# Patient Record
Sex: MALE | Race: WHITE | NOT HISPANIC OR LATINO | Employment: FULL TIME | URBAN - METROPOLITAN AREA
[De-identification: names, ages, dates, MRNs, and addresses within clinical notes are randomized per-mention and may not be internally consistent; named-entity substitution may affect disease eponyms.]

---

## 2017-11-03 ENCOUNTER — TREATMENT (OUTPATIENT)
Dept: PHYSICAL THERAPY | Facility: CLINIC | Age: 47
End: 2017-11-03

## 2017-11-03 DIAGNOSIS — Z02.1 PRE-EMPLOYMENT HEALTH SCREENING EXAMINATION: Primary | ICD-10-CM

## 2017-11-03 PROCEDURE — PDS: Performed by: PHYSICAL THERAPIST

## 2017-11-03 NOTE — PROGRESS NOTES
See full documentation of patient's full treatment in paper chart dated 11/3/2017.  Chart will be in [facility] Med dept.  For [facility].

## 2018-04-24 ENCOUNTER — INPATIENT HOSPITAL (AMBULATORY)
Dept: URBAN - METROPOLITAN AREA HOSPITAL 84 | Facility: HOSPITAL | Age: 48
End: 2018-04-24

## 2018-04-24 DIAGNOSIS — K52.9 NONINFECTIVE GASTROENTERITIS AND COLITIS, UNSPECIFIED: ICD-10-CM

## 2018-04-24 DIAGNOSIS — K20.0 EOSINOPHILIC ESOPHAGITIS: ICD-10-CM

## 2018-04-24 DIAGNOSIS — K20.9 ESOPHAGITIS, UNSPECIFIED: ICD-10-CM

## 2018-04-24 DIAGNOSIS — K56.699 OTHER INTESTINAL OBSTRUCTION UNSPECIFIED AS TO PARTIAL VERSU: ICD-10-CM

## 2018-04-24 DIAGNOSIS — R10.13 EPIGASTRIC PAIN: ICD-10-CM

## 2018-04-24 DIAGNOSIS — K63.3 ULCER OF INTESTINE: ICD-10-CM

## 2018-04-24 DIAGNOSIS — R93.3 ABNORMAL FINDINGS ON DIAGNOSTIC IMAGING OF OTHER PARTS OF DI: ICD-10-CM

## 2018-04-24 DIAGNOSIS — Z90.49 ACQUIRED ABSENCE OF OTHER SPECIFIED PARTS OF DIGESTIVE TRACT: ICD-10-CM

## 2018-04-24 PROCEDURE — 44382 SMALL BOWEL ENDOSCOPY: CPT | Performed by: INTERNAL MEDICINE

## 2018-04-24 PROCEDURE — 43235 EGD DIAGNOSTIC BRUSH WASH: CPT | Performed by: INTERNAL MEDICINE

## 2018-04-26 ENCOUNTER — INPATIENT HOSPITAL (AMBULATORY)
Dept: URBAN - METROPOLITAN AREA HOSPITAL 84 | Facility: HOSPITAL | Age: 48
End: 2018-04-26
Payer: COMMERCIAL

## 2018-04-26 DIAGNOSIS — K63.3 ULCER OF INTESTINE: ICD-10-CM

## 2018-04-26 DIAGNOSIS — K56.699 OTHER INTESTINAL OBSTRUCTION UNSPECIFIED AS TO PARTIAL VERSU: ICD-10-CM

## 2018-04-26 DIAGNOSIS — R93.3 ABNORMAL FINDINGS ON DIAGNOSTIC IMAGING OF OTHER PARTS OF DI: ICD-10-CM

## 2018-04-26 PROCEDURE — 99231 SBSQ HOSP IP/OBS SF/LOW 25: CPT | Performed by: NURSE PRACTITIONER

## 2018-05-03 ENCOUNTER — OFFICE (AMBULATORY)
Dept: URBAN - METROPOLITAN AREA CLINIC 64 | Facility: CLINIC | Age: 48
End: 2018-05-03

## 2018-05-03 VITALS
HEART RATE: 77 BPM | HEIGHT: 72 IN | DIASTOLIC BLOOD PRESSURE: 88 MMHG | WEIGHT: 220 LBS | SYSTOLIC BLOOD PRESSURE: 130 MMHG

## 2018-05-03 DIAGNOSIS — K22.10 ULCER OF ESOPHAGUS WITHOUT BLEEDING: ICD-10-CM

## 2018-05-03 DIAGNOSIS — K51.90 ULCERATIVE COLITIS, UNSPECIFIED, WITHOUT COMPLICATIONS: ICD-10-CM

## 2018-05-03 DIAGNOSIS — I80.9 PHLEBITIS AND THROMBOPHLEBITIS OF UNSPECIFIED SITE: ICD-10-CM

## 2018-05-03 DIAGNOSIS — K44.9 DIAPHRAGMATIC HERNIA WITHOUT OBSTRUCTION OR GANGRENE: ICD-10-CM

## 2018-05-03 DIAGNOSIS — Z90.49 ACQUIRED ABSENCE OF OTHER SPECIFIED PARTS OF DIGESTIVE TRACT: ICD-10-CM

## 2018-05-03 DIAGNOSIS — K21.9 GASTRO-ESOPHAGEAL REFLUX DISEASE WITHOUT ESOPHAGITIS: ICD-10-CM

## 2018-05-03 PROCEDURE — 99213 OFFICE O/P EST LOW 20 MIN: CPT | Performed by: NURSE PRACTITIONER

## 2018-05-03 RX ORDER — OMEPRAZOLE 40 MG/1
CAPSULE, DELAYED RELEASE ORAL
Qty: 90 | Refills: 0 | Status: COMPLETED
End: 2020-01-27

## 2018-05-07 ENCOUNTER — HOSPITAL ENCOUNTER (OUTPATIENT)
Dept: ONCOLOGY | Facility: CLINIC | Age: 48
Discharge: HOME | End: 2018-05-07
Attending: INTERNAL MEDICINE | Admitting: INTERNAL MEDICINE

## 2018-05-07 ENCOUNTER — HOSPITAL ENCOUNTER (OUTPATIENT)
Dept: ONCOLOGY | Facility: CLINIC | Age: 48
Setting detail: INFUSION SERIES
Discharge: HOME | End: 2018-05-07
Attending: INTERNAL MEDICINE | Admitting: INTERNAL MEDICINE

## 2018-05-07 ENCOUNTER — CLINICAL SUPPORT (OUTPATIENT)
Dept: ONCOLOGY | Facility: HOSPITAL | Age: 48
End: 2018-05-07

## 2018-05-07 NOTE — PROGRESS NOTES
PATIENTS ONCOLOGY RECORD LOCATED IN [facility]      Subjective     Name:  JONO GEORGE     Date:  2018  Address:  [address] [place] [address]  Home: [phone]  :  1970 AGE:  47 y.o.        RECORDS OBTAINED:  Patients Oncology Record is located in [facility]

## 2018-05-08 ENCOUNTER — CLINICAL SUPPORT (OUTPATIENT)
Dept: ONCOLOGY | Facility: HOSPITAL | Age: 48
End: 2018-05-08

## 2018-05-08 ENCOUNTER — HOSPITAL ENCOUNTER (OUTPATIENT)
Dept: ONCOLOGY | Facility: CLINIC | Age: 48
Setting detail: INFUSION SERIES
Discharge: HOME | End: 2018-05-08
Attending: INTERNAL MEDICINE | Admitting: INTERNAL MEDICINE

## 2018-05-08 NOTE — PROGRESS NOTES
PATIENTS ONCOLOGY RECORD LOCATED IN [facility]      Subjective     Name:  JONO GEORGE     Date:  2018  Address:  [address] [place] [address]  Home: [phone]  :  1970 AGE:  47 y.o.        RECORDS OBTAINED:  Patients Oncology Record is located in [facility]

## 2018-05-11 ENCOUNTER — HOSPITAL ENCOUNTER (OUTPATIENT)
Dept: ONCOLOGY | Facility: CLINIC | Age: 48
Setting detail: INFUSION SERIES
Discharge: HOME | End: 2018-05-11
Attending: INTERNAL MEDICINE | Admitting: INTERNAL MEDICINE

## 2018-05-11 ENCOUNTER — CLINICAL SUPPORT (OUTPATIENT)
Dept: ONCOLOGY | Facility: HOSPITAL | Age: 48
End: 2018-05-11

## 2018-05-11 NOTE — PROGRESS NOTES
PATIENTS ONCOLOGY RECORD LOCATED IN [facility]      Subjective     Name:  JONO GEORGE     Date:  2018  Address:  [address] [place] [address]  Home: [phone]  :  1970 AGE:  47 y.o.        RECORDS OBTAINED:  Patients Oncology Record is located in [facility]

## 2018-05-17 ENCOUNTER — HOSPITAL ENCOUNTER (OUTPATIENT)
Dept: ONCOLOGY | Facility: CLINIC | Age: 48
Setting detail: INFUSION SERIES
Discharge: HOME | End: 2018-05-17
Attending: INTERNAL MEDICINE | Admitting: INTERNAL MEDICINE

## 2018-05-17 ENCOUNTER — CLINICAL SUPPORT (OUTPATIENT)
Dept: ONCOLOGY | Facility: HOSPITAL | Age: 48
End: 2018-05-17

## 2018-05-17 NOTE — PROGRESS NOTES
PATIENTS ONCOLOGY RECORD LOCATED IN [facility]      Subjective     Name:  JONO GEORGE     Date:  2018  Address:  [address] [place] [address]  Home: [phone]  :  1970 AGE:  47 y.o.        RECORDS OBTAINED:  Patients Oncology Record is located in [facility]

## 2018-05-24 ENCOUNTER — HOSPITAL ENCOUNTER (OUTPATIENT)
Dept: ONCOLOGY | Facility: CLINIC | Age: 48
Setting detail: INFUSION SERIES
Discharge: HOME | End: 2018-05-24
Attending: INTERNAL MEDICINE | Admitting: INTERNAL MEDICINE

## 2018-05-24 ENCOUNTER — HOSPITAL ENCOUNTER (OUTPATIENT)
Dept: ONCOLOGY | Facility: HOSPITAL | Age: 48
Discharge: HOME | End: 2018-05-24
Attending: INTERNAL MEDICINE | Admitting: INTERNAL MEDICINE

## 2018-05-24 ENCOUNTER — CLINICAL SUPPORT (OUTPATIENT)
Dept: ONCOLOGY | Facility: HOSPITAL | Age: 48
End: 2018-05-24

## 2018-05-24 NOTE — PROGRESS NOTES
PATIENTS ONCOLOGY RECORD LOCATED IN [facility] [place]      Subjective     Name:  JONO GEORGE     Date:  2018  Address:  [address] [place] [address]  Home: [phone]  :  1970 AGE:  47 y.o.        RECORDS OBTAINED:  Patients Oncology Record is located in [facility]

## 2018-08-30 ENCOUNTER — CLINICAL SUPPORT (OUTPATIENT)
Dept: ONCOLOGY | Facility: HOSPITAL | Age: 48
End: 2018-08-30

## 2018-08-30 ENCOUNTER — HOSPITAL ENCOUNTER (OUTPATIENT)
Dept: ONCOLOGY | Facility: CLINIC | Age: 48
Setting detail: INFUSION SERIES
Discharge: HOME | End: 2018-08-30
Attending: INTERNAL MEDICINE | Admitting: INTERNAL MEDICINE

## 2018-08-30 ENCOUNTER — HOSPITAL ENCOUNTER (OUTPATIENT)
Dept: ONCOLOGY | Facility: HOSPITAL | Age: 48
Discharge: HOME | End: 2018-08-30
Attending: INTERNAL MEDICINE | Admitting: INTERNAL MEDICINE

## 2018-08-30 LAB
ALBUMIN SERPL-MCNC: 3.4 G/DL (ref 3.5–4.8)
ALBUMIN/GLOB SERPL: 1.1 {RATIO} (ref 1–1.7)
ALP SERPL-CCNC: 55 IU/L (ref 32–91)
ALT SERPL-CCNC: 35 IU/L (ref 17–63)
ANION GAP SERPL CALC-SCNC: 11 MMOL/L (ref 10–20)
AST SERPL-CCNC: 32 IU/L (ref 15–41)
BILIRUB SERPL-MCNC: 0.9 MG/DL (ref 0.3–1.2)
BUN SERPL-MCNC: 8 MG/DL (ref 8–20)
BUN/CREAT SERPL: 8.9 (ref 6.2–20.3)
CALCIUM SERPL-MCNC: 9.3 MG/DL (ref 8.9–10.3)
CHLORIDE SERPL-SCNC: 106 MMOL/L (ref 101–111)
CONV CO2: 23 MMOL/L (ref 22–32)
CONV TOTAL PROTEIN: 6.5 G/DL (ref 6.1–7.9)
CREAT UR-MCNC: 0.9 MG/DL (ref 0.7–1.2)
GLOBULIN UR ELPH-MCNC: 3.1 G/DL (ref 2.5–3.8)
GLUCOSE SERPL-MCNC: 94 MG/DL (ref 65–99)
POTASSIUM SERPL-SCNC: 4 MMOL/L (ref 3.6–5.1)
SODIUM SERPL-SCNC: 136 MMOL/L (ref 136–144)

## 2018-08-30 NOTE — PROGRESS NOTES
PATIENTS ONCOLOGY RECORD LOCATED IN [facility] [place]      Subjective     Name:  JONO GEORGE     Date:  2018  Address:  [address] [place] [address]  Home: [phone]  :  1970 AGE:  47 y.o.        RECORDS OBTAINED:  Patients Oncology Record is located in [facility]

## 2018-10-24 ENCOUNTER — INPATIENT HOSPITAL (AMBULATORY)
Dept: URBAN - METROPOLITAN AREA HOSPITAL 84 | Facility: HOSPITAL | Age: 48
End: 2018-10-24
Payer: COMMERCIAL

## 2018-10-24 DIAGNOSIS — K56.699 OTHER INTESTINAL OBSTRUCTION UNSPECIFIED AS TO PARTIAL VERSU: ICD-10-CM

## 2018-10-24 DIAGNOSIS — Z90.49 ACQUIRED ABSENCE OF OTHER SPECIFIED PARTS OF DIGESTIVE TRACT: ICD-10-CM

## 2018-10-24 DIAGNOSIS — R16.1 SPLENOMEGALY, NOT ELSEWHERE CLASSIFIED: ICD-10-CM

## 2018-10-24 PROCEDURE — 99253 IP/OBS CNSLTJ NEW/EST LOW 45: CPT | Performed by: NURSE PRACTITIONER

## 2018-10-25 ENCOUNTER — INPATIENT HOSPITAL (AMBULATORY)
Dept: URBAN - METROPOLITAN AREA HOSPITAL 84 | Facility: HOSPITAL | Age: 48
End: 2018-10-25
Payer: COMMERCIAL

## 2018-10-25 DIAGNOSIS — Z90.49 ACQUIRED ABSENCE OF OTHER SPECIFIED PARTS OF DIGESTIVE TRACT: ICD-10-CM

## 2018-10-25 DIAGNOSIS — K56.52 INTESTINAL ADHESIONS [BANDS] WITH COMPLETE OBSTRUCTION: ICD-10-CM

## 2018-10-25 DIAGNOSIS — K52.89 OTHER SPECIFIED NONINFECTIVE GASTROENTERITIS AND COLITIS: ICD-10-CM

## 2018-10-25 DIAGNOSIS — K63.5 POLYP OF COLON: ICD-10-CM

## 2018-10-25 PROCEDURE — 45338 SIGMOIDOSCOPY W/TUMR REMOVE: CPT | Mod: 59 | Performed by: INTERNAL MEDICINE

## 2018-10-25 PROCEDURE — 45340 SIG W/TNDSC BALLOON DILATION: CPT | Performed by: INTERNAL MEDICINE

## 2018-10-30 ENCOUNTER — CONVERSION ENCOUNTER (OUTPATIENT)
Dept: SURGERY | Facility: CLINIC | Age: 48
End: 2018-10-30

## 2019-06-04 VITALS
HEIGHT: 72 IN | HEART RATE: 73 BPM | SYSTOLIC BLOOD PRESSURE: 162 MMHG | BODY MASS INDEX: 30.8 KG/M2 | DIASTOLIC BLOOD PRESSURE: 100 MMHG | WEIGHT: 227.4 LBS | OXYGEN SATURATION: 97 %

## 2020-01-27 ENCOUNTER — OFFICE (AMBULATORY)
Dept: URBAN - METROPOLITAN AREA CLINIC 64 | Facility: CLINIC | Age: 50
End: 2020-01-27

## 2020-01-27 VITALS
DIASTOLIC BLOOD PRESSURE: 92 MMHG | SYSTOLIC BLOOD PRESSURE: 141 MMHG | WEIGHT: 231 LBS | HEIGHT: 72 IN | HEART RATE: 95 BPM

## 2020-01-27 DIAGNOSIS — K22.10 ULCER OF ESOPHAGUS WITHOUT BLEEDING: ICD-10-CM

## 2020-01-27 DIAGNOSIS — K20.0 EOSINOPHILIC ESOPHAGITIS: ICD-10-CM

## 2020-01-27 DIAGNOSIS — R13.11 DYSPHAGIA, ORAL PHASE: ICD-10-CM

## 2020-01-27 DIAGNOSIS — K44.9 DIAPHRAGMATIC HERNIA WITHOUT OBSTRUCTION OR GANGRENE: ICD-10-CM

## 2020-01-27 PROCEDURE — 99213 OFFICE O/P EST LOW 20 MIN: CPT | Performed by: INTERNAL MEDICINE

## 2020-01-27 RX ORDER — CETIRIZINE HYDROCHLORIDE 10 MG/1
TABLET, FILM COATED ORAL
Qty: 90 | Refills: 6 | Status: COMPLETED
Start: 2020-01-27 | End: 2020-09-24

## 2020-01-27 RX ORDER — OMEPRAZOLE 40 MG/1
CAPSULE, DELAYED RELEASE ORAL
Qty: 90 | Refills: 3 | Status: COMPLETED
Start: 2020-01-27 | End: 2021-08-04

## 2020-02-14 ENCOUNTER — ON CAMPUS - OUTPATIENT (AMBULATORY)
Dept: URBAN - METROPOLITAN AREA HOSPITAL 2 | Facility: HOSPITAL | Age: 50
End: 2020-02-14

## 2020-02-14 VITALS
DIASTOLIC BLOOD PRESSURE: 76 MMHG | SYSTOLIC BLOOD PRESSURE: 151 MMHG | HEIGHT: 72 IN | WEIGHT: 220 LBS | SYSTOLIC BLOOD PRESSURE: 127 MMHG | SYSTOLIC BLOOD PRESSURE: 112 MMHG | DIASTOLIC BLOOD PRESSURE: 86 MMHG | RESPIRATION RATE: 18 BRPM | DIASTOLIC BLOOD PRESSURE: 96 MMHG | OXYGEN SATURATION: 97 % | TEMPERATURE: 98.3 F | OXYGEN SATURATION: 94 % | HEART RATE: 79 BPM | SYSTOLIC BLOOD PRESSURE: 153 MMHG | OXYGEN SATURATION: 90 % | HEART RATE: 91 BPM | DIASTOLIC BLOOD PRESSURE: 73 MMHG | RESPIRATION RATE: 16 BRPM | SYSTOLIC BLOOD PRESSURE: 121 MMHG | DIASTOLIC BLOOD PRESSURE: 100 MMHG | HEART RATE: 94 BPM

## 2020-02-14 DIAGNOSIS — K20.0 EOSINOPHILIC ESOPHAGITIS: ICD-10-CM

## 2020-02-14 DIAGNOSIS — K44.9 DIAPHRAGMATIC HERNIA WITHOUT OBSTRUCTION OR GANGRENE: ICD-10-CM

## 2020-02-14 DIAGNOSIS — K22.8 OTHER SPECIFIED DISEASES OF ESOPHAGUS: ICD-10-CM

## 2020-02-14 PROCEDURE — 43235 EGD DIAGNOSTIC BRUSH WASH: CPT | Performed by: INTERNAL MEDICINE

## 2020-02-14 PROCEDURE — 43450 DILATE ESOPHAGUS 1/MULT PASS: CPT | Performed by: INTERNAL MEDICINE

## 2020-02-14 RX ORDER — OMEPRAZOLE 40 MG/1
CAPSULE, DELAYED RELEASE ORAL
Qty: 90 | Refills: 3 | Status: COMPLETED
Start: 2020-01-27 | End: 2021-08-04

## 2020-02-14 RX ORDER — FLUTICASONE PROPIONATE 220 UG/1
AEROSOL, METERED RESPIRATORY (INHALATION)
Qty: 1 | Refills: 5 | Status: COMPLETED
Start: 2020-02-14 | End: 2020-09-24

## 2020-02-14 RX ORDER — CETIRIZINE HYDROCHLORIDE 10 MG/1
TABLET, FILM COATED ORAL
Qty: 90 | Refills: 6 | Status: COMPLETED
Start: 2020-01-27 | End: 2020-09-24

## 2020-02-14 RX ORDER — MONTELUKAST SODIUM 10 MG/1
TABLET, FILM COATED ORAL
Qty: 90 | Refills: 6 | Status: COMPLETED
Start: 2020-02-14 | End: 2020-09-24

## 2020-09-24 ENCOUNTER — OFFICE (AMBULATORY)
Dept: URBAN - METROPOLITAN AREA CLINIC 64 | Facility: CLINIC | Age: 50
End: 2020-09-24

## 2020-09-24 VITALS
WEIGHT: 217 LBS | SYSTOLIC BLOOD PRESSURE: 164 MMHG | HEIGHT: 72 IN | HEART RATE: 84 BPM | DIASTOLIC BLOOD PRESSURE: 106 MMHG

## 2020-09-24 DIAGNOSIS — K22.10 ULCER OF ESOPHAGUS WITHOUT BLEEDING: ICD-10-CM

## 2020-09-24 DIAGNOSIS — G47.01 INSOMNIA DUE TO MEDICAL CONDITION: ICD-10-CM

## 2020-09-24 DIAGNOSIS — K22.2 ESOPHAGEAL OBSTRUCTION: ICD-10-CM

## 2020-09-24 DIAGNOSIS — K44.9 DIAPHRAGMATIC HERNIA WITHOUT OBSTRUCTION OR GANGRENE: ICD-10-CM

## 2020-09-24 DIAGNOSIS — K20.0 EOSINOPHILIC ESOPHAGITIS: ICD-10-CM

## 2020-09-24 DIAGNOSIS — R13.11 DYSPHAGIA, ORAL PHASE: ICD-10-CM

## 2020-09-24 PROCEDURE — 99213 OFFICE O/P EST LOW 20 MIN: CPT | Performed by: INTERNAL MEDICINE

## 2020-09-24 RX ORDER — AMITRIPTYLINE HYDROCHLORIDE 25 MG/1
25 TABLET, FILM COATED ORAL
Qty: 30 | Refills: 5 | Status: COMPLETED
Start: 2020-09-24 | End: 2021-08-04

## 2020-09-24 RX ORDER — OMEPRAZOLE 40 MG/1
CAPSULE, DELAYED RELEASE ORAL
Qty: 90 | Refills: 3 | Status: COMPLETED
Start: 2020-01-27 | End: 2021-08-04

## 2020-09-25 ENCOUNTER — ON CAMPUS - OUTPATIENT (AMBULATORY)
Dept: URBAN - METROPOLITAN AREA HOSPITAL 2 | Facility: HOSPITAL | Age: 50
End: 2020-09-25

## 2020-09-25 VITALS
DIASTOLIC BLOOD PRESSURE: 97 MMHG | HEART RATE: 80 BPM | TEMPERATURE: 97.6 F | RESPIRATION RATE: 18 BRPM | OXYGEN SATURATION: 98 % | HEART RATE: 74 BPM | SYSTOLIC BLOOD PRESSURE: 155 MMHG | HEIGHT: 72 IN | RESPIRATION RATE: 20 BRPM | HEART RATE: 77 BPM | OXYGEN SATURATION: 95 % | HEART RATE: 87 BPM | SYSTOLIC BLOOD PRESSURE: 220 MMHG | DIASTOLIC BLOOD PRESSURE: 108 MMHG | OXYGEN SATURATION: 93 % | SYSTOLIC BLOOD PRESSURE: 166 MMHG | DIASTOLIC BLOOD PRESSURE: 91 MMHG | SYSTOLIC BLOOD PRESSURE: 124 MMHG | DIASTOLIC BLOOD PRESSURE: 145 MMHG | WEIGHT: 216.2 LBS

## 2020-09-25 DIAGNOSIS — R06.6 HICCOUGH: ICD-10-CM

## 2020-09-25 DIAGNOSIS — K20.0 EOSINOPHILIC ESOPHAGITIS: ICD-10-CM

## 2020-09-25 DIAGNOSIS — R13.11 DYSPHAGIA, ORAL PHASE: ICD-10-CM

## 2020-09-25 DIAGNOSIS — K22.2 ESOPHAGEAL OBSTRUCTION: ICD-10-CM

## 2020-09-25 DIAGNOSIS — K44.9 DIAPHRAGMATIC HERNIA WITHOUT OBSTRUCTION OR GANGRENE: ICD-10-CM

## 2020-09-25 PROBLEM — K20.8 OTHER ESOPHAGITIS: Status: ACTIVE | Noted: 2020-09-25

## 2020-09-25 PROCEDURE — 43235 EGD DIAGNOSTIC BRUSH WASH: CPT | Performed by: INTERNAL MEDICINE

## 2020-09-25 PROCEDURE — 43450 DILATE ESOPHAGUS 1/MULT PASS: CPT | Performed by: INTERNAL MEDICINE

## 2020-09-25 RX ORDER — MONTELUKAST SODIUM 10 MG/1
TABLET, FILM COATED ORAL
Qty: 90 | Refills: 6 | Status: COMPLETED
Start: 2020-09-25 | End: 2021-08-04

## 2020-09-25 RX ORDER — CETIRIZINE HYDROCHLORIDE 10 MG/1
TABLET, FILM COATED ORAL
Qty: 90 | Refills: 6 | Status: COMPLETED
Start: 2020-09-25 | End: 2021-08-04

## 2020-10-06 ENCOUNTER — OFFICE VISIT (OUTPATIENT)
Dept: SURGERY | Facility: CLINIC | Age: 50
End: 2020-10-06

## 2020-10-06 ENCOUNTER — PREP FOR SURGERY (OUTPATIENT)
Dept: OTHER | Facility: HOSPITAL | Age: 50
End: 2020-10-06

## 2020-10-06 VITALS
BODY MASS INDEX: 30.07 KG/M2 | HEART RATE: 68 BPM | DIASTOLIC BLOOD PRESSURE: 98 MMHG | WEIGHT: 222 LBS | OXYGEN SATURATION: 96 % | SYSTOLIC BLOOD PRESSURE: 164 MMHG | HEIGHT: 72 IN

## 2020-10-06 DIAGNOSIS — K21.9 HIATAL HERNIA WITH GASTROESOPHAGEAL REFLUX: Primary | ICD-10-CM

## 2020-10-06 DIAGNOSIS — K44.9 HIATAL HERNIA WITH GASTROESOPHAGEAL REFLUX: Primary | ICD-10-CM

## 2020-10-06 PROCEDURE — 99203 OFFICE O/P NEW LOW 30 MIN: CPT | Performed by: SURGERY

## 2020-10-06 RX ORDER — SODIUM CHLORIDE 9 MG/ML
100 INJECTION, SOLUTION INTRAVENOUS CONTINUOUS
Status: CANCELLED | OUTPATIENT
Start: 2020-10-06

## 2020-10-06 RX ORDER — OMEPRAZOLE 40 MG/1
40 CAPSULE, DELAYED RELEASE ORAL DAILY
COMMUNITY
Start: 2020-09-24

## 2020-10-06 NOTE — PROGRESS NOTES
Subjective   Derick Miller is a 49 y.o. male.     History of present illness  Derick is a pleasant 49-year-old seen in the office today at the request of Dr. Bee for a long history of GE reflux disease and medium sized hiatal hernia.  Patient states that he has had multiple scopes multiple surgeries and does not want to have to take medication the rest of his life for his reflux.  Dr. Bee is suggested that he consider transoral fundoplication.  He is here today to discuss that further.    Patient states historically that he has problem swallowing food gets stuck at times he at times has epigastric discomfort and feels lots of pressure.  After he gets scoped and dilated he feels better for a while and but it does not last.  Dr. Bee just recently scoped him found a medium sized hiatal hernia and dilated him to 58 French.    Past Medical History:   Diagnosis Date   • GERD (gastroesophageal reflux disease)    • Hiatal hernia        Past Surgical History:   Procedure Laterality Date   • COLON SURGERY  1996    colon removed       Outpatient Encounter Medications as of 10/6/2020   Medication Sig Dispense Refill   • omeprazole (priLOSEC) 40 MG capsule Take 40 mg by mouth Daily As Needed.       No facility-administered encounter medications on file as of 10/6/2020.        Allergies   Allergen Reactions   • Flonase  [Fluticasone] Anaphylaxis   • Ciprofloxacin Other (See Comments)       History reviewed. No pertinent family history.    Social History     Socioeconomic History   • Marital status:      Spouse name: Not on file   • Number of children: Not on file   • Years of education: Not on file   • Highest education level: Not on file   Tobacco Use   • Smoking status: Never Smoker   • Smokeless tobacco: Never Used   Substance and Sexual Activity   • Alcohol use: Yes     Comment: occ   • Drug use: Never   • Sexual activity: Defer       The following portions of the patient's history were reviewed and  updated as appropriate: allergies, current medications, past family history, past medical history, past social history, past surgical history and problem list.    Objective       Assessment/Plan   There are no diagnoses linked to this encounter.    Complete review of systems is done and unremarkable exception of the chief complaint.    Physical exam shows a pleasant 49-year-old male.  HEENT is negative.  Heart regular.  Lungs clear.  Abdomen soft nontender without mass.  Extremities with equal range of motion in the upper and lower extremities.  He has symmetrical strength and usage.  Neuro shows no obvious focal deficit.    Impression: 49-year-old with medium sized hiatal hernia and reflux    Plan: I discussed with him that the biggest issue we may face is because of his previous total colectomy he may have lots of scarring and adhesions.  Told him there is a chance we would have to convert to an open when repairing his hiatal hernia.  He understands agrees and wishes to proceed with surgery.           Tom Garcia, DO  10/6/2020  10:30 EDT

## 2020-10-06 NOTE — H&P
Subjective   Derick Miller is a 49 y.o. male.     History of present illness  Derick is a pleasant 49-year-old seen in the office today at the request of Dr. Bee for a long history of GE reflux disease and medium sized hiatal hernia.  Patient states that he has had multiple scopes multiple surgeries and does not want to have to take medication the rest of his life for his reflux.  Dr. Bee is suggested that he consider transoral fundoplication.  He is here today to discuss that further.    Patient states historically that he has problem swallowing food gets stuck at times he at times has epigastric discomfort and feels lots of pressure.  After he gets scoped and dilated he feels better for a while and but it does not last.  Dr. Bee just recently scoped him found a medium sized hiatal hernia and dilated him to 58 French.    Past Medical History:   Diagnosis Date   • GERD (gastroesophageal reflux disease)    • Hiatal hernia        Past Surgical History:   Procedure Laterality Date   • COLON SURGERY  1996    colon removed       Outpatient Encounter Medications as of 10/6/2020   Medication Sig Dispense Refill   • omeprazole (priLOSEC) 40 MG capsule Take 40 mg by mouth Daily As Needed.       No facility-administered encounter medications on file as of 10/6/2020.        Allergies   Allergen Reactions   • Flonase  [Fluticasone] Anaphylaxis   • Ciprofloxacin Other (See Comments)       No family history on file.    Social History     Socioeconomic History   • Marital status:      Spouse name: Not on file   • Number of children: Not on file   • Years of education: Not on file   • Highest education level: Not on file   Tobacco Use   • Smoking status: Never Smoker   • Smokeless tobacco: Never Used   Substance and Sexual Activity   • Alcohol use: Yes     Comment: occ   • Drug use: Never   • Sexual activity: Defer       The following portions of the patient's history were reviewed and updated as appropriate:  allergies, current medications, past family history, past medical history, past social history, past surgical history and problem list.    Objective       Assessment/Plan   There are no diagnoses linked to this encounter.    Complete review of systems is done and unremarkable exception of the chief complaint.    Physical exam shows a pleasant 49-year-old male.  HEENT is negative.  Heart regular.  Lungs clear.  Abdomen soft nontender without mass.  Extremities with equal range of motion in the upper and lower extremities.  He has symmetrical strength and usage.  Neuro shows no obvious focal deficit.    Impression: 49-year-old with medium sized hiatal hernia and reflux    Plan: I discussed with him that the biggest issue we may face is because of his previous total colectomy he may have lots of scarring and adhesions.  Told him there is a chance we would have to convert to an open when repairing his hiatal hernia.  He understands agrees and wishes to proceed with surgery.           Tom Garcia,   10/6/2020  10:35 EDT

## 2020-10-23 ENCOUNTER — APPOINTMENT (OUTPATIENT)
Dept: CT IMAGING | Facility: HOSPITAL | Age: 50
End: 2020-10-23

## 2020-10-24 ENCOUNTER — HOSPITAL ENCOUNTER (OUTPATIENT)
Dept: CT IMAGING | Facility: HOSPITAL | Age: 50
Discharge: HOME | End: 2020-10-24
Admitting: SURGERY

## 2020-10-24 DIAGNOSIS — K21.9 HIATAL HERNIA WITH GASTROESOPHAGEAL REFLUX: ICD-10-CM

## 2020-10-24 DIAGNOSIS — K44.9 HIATAL HERNIA WITH GASTROESOPHAGEAL REFLUX: ICD-10-CM

## 2020-10-24 PROCEDURE — 74177 CT ABD & PELVIS W/CONTRAST: CPT

## 2020-10-24 PROCEDURE — 0 IOPAMIDOL PER 1 ML: Performed by: SURGERY

## 2020-10-24 RX ADMIN — IOPAMIDOL 100 ML: 755 INJECTION, SOLUTION INTRAVENOUS at 09:15

## 2020-10-31 ENCOUNTER — LAB (OUTPATIENT)
Dept: LAB | Facility: HOSPITAL | Age: 50
End: 2020-10-31

## 2020-10-31 ENCOUNTER — HOSPITAL ENCOUNTER (OUTPATIENT)
Dept: CARDIOLOGY | Facility: HOSPITAL | Age: 50
Discharge: HOME | End: 2020-10-31

## 2020-10-31 DIAGNOSIS — K21.9 HIATAL HERNIA WITH GASTROESOPHAGEAL REFLUX: ICD-10-CM

## 2020-10-31 DIAGNOSIS — K44.9 HIATAL HERNIA WITH GASTROESOPHAGEAL REFLUX: ICD-10-CM

## 2020-10-31 LAB
ABO GROUP BLD: NORMAL
ALBUMIN SERPL-MCNC: 4.3 G/DL (ref 3.5–5.2)
ALBUMIN/GLOB SERPL: 1.4 G/DL
ALP SERPL-CCNC: 74 U/L (ref 39–117)
ALT SERPL W P-5'-P-CCNC: 55 U/L (ref 1–41)
ANION GAP SERPL CALCULATED.3IONS-SCNC: 8.2 MMOL/L (ref 5–15)
APTT PPP: 28.8 SECONDS (ref 24–31)
AST SERPL-CCNC: 40 U/L (ref 1–40)
BASOPHILS # BLD AUTO: 0.04 10*3/MM3 (ref 0–0.2)
BASOPHILS NFR BLD AUTO: 0.6 % (ref 0–1.5)
BILIRUB SERPL-MCNC: 1.3 MG/DL (ref 0–1.2)
BLD GP AB SCN SERPL QL: NEGATIVE
BUN SERPL-MCNC: 20 MG/DL (ref 6–20)
BUN/CREAT SERPL: 14.8 (ref 7–25)
CALCIUM SPEC-SCNC: 10.1 MG/DL (ref 8.6–10.5)
CHLORIDE SERPL-SCNC: 100 MMOL/L (ref 98–107)
CO2 SERPL-SCNC: 29.8 MMOL/L (ref 22–29)
CREAT SERPL-MCNC: 1.35 MG/DL (ref 0.76–1.27)
DEPRECATED RDW RBC AUTO: 44.7 FL (ref 37–54)
EOSINOPHIL # BLD AUTO: 0.19 10*3/MM3 (ref 0–0.4)
EOSINOPHIL NFR BLD AUTO: 2.9 % (ref 0.3–6.2)
ERYTHROCYTE [DISTWIDTH] IN BLOOD BY AUTOMATED COUNT: 19 % (ref 12.3–15.4)
GFR SERPL CREATININE-BSD FRML MDRD: 56 ML/MIN/1.73
GLOBULIN UR ELPH-MCNC: 3.1 GM/DL
GLUCOSE SERPL-MCNC: 80 MG/DL (ref 65–99)
HCT VFR BLD AUTO: 60.5 % (ref 37.5–51)
HGB BLD-MCNC: 19.4 G/DL (ref 13–17.7)
INR PPP: 1.04 (ref 0.93–1.1)
LYMPHOCYTES # BLD AUTO: 1.42 10*3/MM3 (ref 0.7–3.1)
LYMPHOCYTES NFR BLD AUTO: 21.6 % (ref 19.6–45.3)
MCH RBC QN AUTO: 24.3 PG (ref 26.6–33)
MCHC RBC AUTO-ENTMCNC: 32.1 G/DL (ref 31.5–35.7)
MCV RBC AUTO: 75.7 FL (ref 79–97)
MONOCYTES # BLD AUTO: 0.66 10*3/MM3 (ref 0.1–0.9)
MONOCYTES NFR BLD AUTO: 10 % (ref 5–12)
NEUTROPHILS NFR BLD AUTO: 4.24 10*3/MM3 (ref 1.7–7)
NEUTROPHILS NFR BLD AUTO: 64.4 % (ref 42.7–76)
PLATELET # BLD AUTO: 207 10*3/MM3 (ref 140–450)
PMV BLD AUTO: 11.4 FL (ref 6–12)
POTASSIUM SERPL-SCNC: 4.5 MMOL/L (ref 3.5–5.2)
PROT SERPL-MCNC: 7.4 G/DL (ref 6–8.5)
PROTHROMBIN TIME: 11.4 SECONDS (ref 9.6–11.7)
RBC # BLD AUTO: 7.99 10*6/MM3 (ref 4.14–5.8)
RH BLD: NEGATIVE
SODIUM SERPL-SCNC: 138 MMOL/L (ref 136–145)
T&S EXPIRATION DATE: NORMAL
WBC # BLD AUTO: 6.58 10*3/MM3 (ref 3.4–10.8)

## 2020-10-31 PROCEDURE — 86900 BLOOD TYPING SEROLOGIC ABO: CPT | Performed by: SURGERY

## 2020-10-31 PROCEDURE — 85730 THROMBOPLASTIN TIME PARTIAL: CPT

## 2020-10-31 PROCEDURE — 86900 BLOOD TYPING SEROLOGIC ABO: CPT

## 2020-10-31 PROCEDURE — 86850 RBC ANTIBODY SCREEN: CPT | Performed by: SURGERY

## 2020-10-31 PROCEDURE — 85025 COMPLETE CBC W/AUTO DIFF WBC: CPT

## 2020-10-31 PROCEDURE — 86901 BLOOD TYPING SEROLOGIC RH(D): CPT

## 2020-10-31 PROCEDURE — 93010 ELECTROCARDIOGRAM REPORT: CPT | Performed by: INTERNAL MEDICINE

## 2020-10-31 PROCEDURE — 85610 PROTHROMBIN TIME: CPT

## 2020-10-31 PROCEDURE — 86901 BLOOD TYPING SEROLOGIC RH(D): CPT | Performed by: SURGERY

## 2020-10-31 PROCEDURE — 36415 COLL VENOUS BLD VENIPUNCTURE: CPT

## 2020-10-31 PROCEDURE — 80053 COMPREHEN METABOLIC PANEL: CPT

## 2020-10-31 PROCEDURE — 93005 ELECTROCARDIOGRAM TRACING: CPT | Performed by: SURGERY

## 2020-11-04 LAB — QT INTERVAL: 396 MS

## 2020-11-06 ENCOUNTER — LAB (OUTPATIENT)
Dept: LAB | Facility: HOSPITAL | Age: 50
End: 2020-11-06

## 2020-11-06 ENCOUNTER — ANESTHESIA EVENT (OUTPATIENT)
Dept: PERIOP | Facility: HOSPITAL | Age: 50
End: 2020-11-06

## 2020-11-06 PROCEDURE — U0004 COV-19 TEST NON-CDC HGH THRU: HCPCS

## 2020-11-06 PROCEDURE — C9803 HOPD COVID-19 SPEC COLLECT: HCPCS

## 2020-11-07 LAB — SARS-COV-2 RNA RESP QL NAA+PROBE: DETECTED

## 2020-11-09 ENCOUNTER — ANESTHESIA (OUTPATIENT)
Dept: PERIOP | Facility: HOSPITAL | Age: 50
End: 2020-11-09

## 2020-11-09 ENCOUNTER — TELEPHONE (OUTPATIENT)
Dept: SURGERY | Facility: CLINIC | Age: 50
End: 2020-11-09

## 2020-11-10 ENCOUNTER — PREP FOR SURGERY (OUTPATIENT)
Dept: OTHER | Facility: HOSPITAL | Age: 50
End: 2020-11-10

## 2020-11-10 DIAGNOSIS — K21.9 HIATAL HERNIA WITH GASTROESOPHAGEAL REFLUX: Primary | ICD-10-CM

## 2020-11-10 DIAGNOSIS — K44.9 HIATAL HERNIA WITH GASTROESOPHAGEAL REFLUX: Primary | ICD-10-CM

## 2020-11-10 RX ORDER — SODIUM CHLORIDE 9 MG/ML
100 INJECTION, SOLUTION INTRAVENOUS CONTINUOUS
Status: CANCELLED | OUTPATIENT
Start: 2020-11-10

## 2020-11-10 NOTE — H&P
Subjective   Derick Miller is a 50 y.o. male.     History of present illness  Derick is a pleasant 49-year-old seen in the office today at the request of Dr. Bee for a long history of GE reflux disease and medium sized hiatal hernia.  Patient states that he has had multiple scopes multiple surgeries and does not want to have to take medication the rest of his life for his reflux.  Dr. Bee is suggested that he consider transoral fundoplication.  He is here today to discuss that further.    Patient states historically that he has problem swallowing food gets stuck at times he at times has epigastric discomfort and feels lots of pressure.  After he gets scoped and dilated he feels better for a while and but it does not last.  Dr. Bee just recently scoped him found a medium sized hiatal hernia and dilated him to 58 French.    Past Medical History:   Diagnosis Date   • Dysphagia    • GERD (gastroesophageal reflux disease)    • Hiatal hernia    • Loose stools    • Polycythemia     saida pierce   • Ulcerative colitis (CMS/[facility])        Past Surgical History:   Procedure Laterality Date   • COLON SURGERY  1996    total colectomy /resection   • ENDOSCOPY      dilation   • LAPAROSCOPIC ASSISSTED TOTAL COLECTOMY W/ J-POUCH     • [facility]       Outpatient Encounter Medications as of 11/10/2020   Medication Sig Dispense Refill   • aspirin 325 MG tablet Take 325 mg by mouth Daily. To stop 5 days prior per surgeon     • cetirizine (zyrTEC) 10 MG tablet Take 10 mg by mouth every night at bedtime.     • loperamide (Imodium A-D) 2 MG tablet Take 2 mg by mouth 3 (Three) Times a Day As Needed for Diarrhea.     • montelukast (SINGULAIR) 10 MG tablet Take 10 mg by mouth Every Night.     • Multiple Vitamins-Minerals (MULTIVITAMIN ADULT PO) Take  by mouth. Stop now for surgery     • omeprazole (priLOSEC) 40 MG capsule Take 40 mg by mouth Daily. Take dos       No facility-administered encounter  medications on file as of 11/10/2020.        Allergies   Allergen Reactions   • Flonase  [Fluticasone] Anaphylaxis   • Ciprofloxacin Other (See Comments)       No family history on file.    Social History     Socioeconomic History   • Marital status:      Spouse name: Not on file   • Number of children: Not on file   • Years of education: Not on file   • Highest education level: Not on file   Tobacco Use   • Smoking status: Never Smoker   • Smokeless tobacco: Never Used   Substance and Sexual Activity   • Alcohol use: Yes     Comment: occ   • Drug use: Never   • Sexual activity: Defer       The following portions of the patient's history were reviewed and updated as appropriate: allergies, current medications, past family history, past medical history, past social history, past surgical history and problem list.    Objective       Assessment/Plan   There are no diagnoses linked to this encounter.    Complete review of systems is done and unremarkable exception of the chief complaint.    Physical exam shows a pleasant 49-year-old male.  HEENT is negative.  Heart regular.  Lungs clear.  Abdomen soft nontender without mass.  Extremities with equal range of motion in the upper and lower extremities.  He has symmetrical strength and usage.  Neuro shows no obvious focal deficit.    Impression: 49-year-old with medium sized hiatal hernia and reflux    Plan: I discussed with him that the biggest issue we may face is because of his previous total colectomy he may have lots of scarring and adhesions.  Told him there is a chance we would have to convert to an open when repairing his hiatal hernia.  He understands agrees and wishes to proceed with surgery.           Tom Garcia, DO  11/10/2020  08:32 EST

## 2020-12-10 ENCOUNTER — LAB (OUTPATIENT)
Dept: LAB | Facility: HOSPITAL | Age: 50
End: 2020-12-10

## 2020-12-10 ENCOUNTER — HOSPITAL ENCOUNTER (OUTPATIENT)
Dept: CARDIOLOGY | Facility: HOSPITAL | Age: 50
Discharge: HOME | End: 2020-12-10

## 2020-12-10 PROCEDURE — 93005 ELECTROCARDIOGRAM TRACING: CPT | Performed by: SURGERY

## 2020-12-10 PROCEDURE — 93010 ELECTROCARDIOGRAM REPORT: CPT | Performed by: INTERNAL MEDICINE

## 2020-12-11 ENCOUNTER — LAB (OUTPATIENT)
Dept: LAB | Facility: HOSPITAL | Age: 50
End: 2020-12-11

## 2020-12-11 DIAGNOSIS — K44.9 HIATAL HERNIA WITH GASTROESOPHAGEAL REFLUX: ICD-10-CM

## 2020-12-11 DIAGNOSIS — K21.9 HIATAL HERNIA WITH GASTROESOPHAGEAL REFLUX: ICD-10-CM

## 2020-12-11 LAB
ABO GROUP BLD: NORMAL
ALBUMIN SERPL-MCNC: 4 G/DL (ref 3.5–5.2)
ALBUMIN/GLOB SERPL: 1.7 G/DL
ALP SERPL-CCNC: 74 U/L (ref 39–117)
ALT SERPL W P-5'-P-CCNC: 42 U/L (ref 1–41)
ANION GAP SERPL CALCULATED.3IONS-SCNC: 6.7 MMOL/L (ref 5–15)
APTT PPP: 28.2 SECONDS (ref 24–31)
AST SERPL-CCNC: 27 U/L (ref 1–40)
BASOPHILS # BLD AUTO: 0.04 10*3/MM3 (ref 0–0.2)
BASOPHILS NFR BLD AUTO: 0.6 % (ref 0–1.5)
BILIRUB SERPL-MCNC: 1 MG/DL (ref 0–1.2)
BLD GP AB SCN SERPL QL: NEGATIVE
BUN SERPL-MCNC: 19 MG/DL (ref 6–20)
BUN/CREAT SERPL: 17.9 (ref 7–25)
CALCIUM SPEC-SCNC: 9.2 MG/DL (ref 8.6–10.5)
CHLORIDE SERPL-SCNC: 100 MMOL/L (ref 98–107)
CO2 SERPL-SCNC: 25.3 MMOL/L (ref 22–29)
CREAT SERPL-MCNC: 1.06 MG/DL (ref 0.76–1.27)
DEPRECATED RDW RBC AUTO: 54.5 FL (ref 37–54)
EOSINOPHIL # BLD AUTO: 0.25 10*3/MM3 (ref 0–0.4)
EOSINOPHIL NFR BLD AUTO: 4.1 % (ref 0.3–6.2)
ERYTHROCYTE [DISTWIDTH] IN BLOOD BY AUTOMATED COUNT: 21.2 % (ref 12.3–15.4)
GFR SERPL CREATININE-BSD FRML MDRD: 74 ML/MIN/1.73
GLOBULIN UR ELPH-MCNC: 2.4 GM/DL
GLUCOSE SERPL-MCNC: 91 MG/DL (ref 65–99)
HCT VFR BLD AUTO: 52.6 % (ref 37.5–51)
HGB BLD-MCNC: 17.4 G/DL (ref 13–17.7)
IMM GRANULOCYTES # BLD AUTO: 0.05 10*3/MM3 (ref 0–0.05)
IMM GRANULOCYTES NFR BLD AUTO: 0.8 % (ref 0–0.5)
INR PPP: 1.04 (ref 0.93–1.1)
LYMPHOCYTES # BLD AUTO: 1.46 10*3/MM3 (ref 0.7–3.1)
LYMPHOCYTES NFR BLD AUTO: 23.7 % (ref 19.6–45.3)
MCH RBC QN AUTO: 25.9 PG (ref 26.6–33)
MCHC RBC AUTO-ENTMCNC: 33.1 G/DL (ref 31.5–35.7)
MCV RBC AUTO: 78.3 FL (ref 79–97)
MONOCYTES # BLD AUTO: 0.79 10*3/MM3 (ref 0.1–0.9)
MONOCYTES NFR BLD AUTO: 12.8 % (ref 5–12)
NEUTROPHILS NFR BLD AUTO: 3.58 10*3/MM3 (ref 1.7–7)
NEUTROPHILS NFR BLD AUTO: 58 % (ref 42.7–76)
NRBC BLD AUTO-RTO: 0 /100 WBC (ref 0–0.2)
PLATELET # BLD AUTO: 162 10*3/MM3 (ref 140–450)
PMV BLD AUTO: 11.8 FL (ref 6–12)
POTASSIUM SERPL-SCNC: 3.9 MMOL/L (ref 3.5–5.2)
PROT SERPL-MCNC: 6.4 G/DL (ref 6–8.5)
PROTHROMBIN TIME: 11.4 SECONDS (ref 9.6–11.7)
RBC # BLD AUTO: 6.72 10*6/MM3 (ref 4.14–5.8)
RH BLD: NEGATIVE
SODIUM SERPL-SCNC: 132 MMOL/L (ref 136–145)
T&S EXPIRATION DATE: NORMAL
WBC # BLD AUTO: 6.17 10*3/MM3 (ref 3.4–10.8)

## 2020-12-11 PROCEDURE — 86901 BLOOD TYPING SEROLOGIC RH(D): CPT

## 2020-12-11 PROCEDURE — U0004 COV-19 TEST NON-CDC HGH THRU: HCPCS

## 2020-12-11 PROCEDURE — 80053 COMPREHEN METABOLIC PANEL: CPT

## 2020-12-11 PROCEDURE — 86900 BLOOD TYPING SEROLOGIC ABO: CPT

## 2020-12-11 PROCEDURE — 85025 COMPLETE CBC W/AUTO DIFF WBC: CPT

## 2020-12-11 PROCEDURE — 86850 RBC ANTIBODY SCREEN: CPT

## 2020-12-11 PROCEDURE — 85610 PROTHROMBIN TIME: CPT

## 2020-12-11 PROCEDURE — C9803 HOPD COVID-19 SPEC COLLECT: HCPCS

## 2020-12-11 PROCEDURE — 36415 COLL VENOUS BLD VENIPUNCTURE: CPT

## 2020-12-11 PROCEDURE — 85730 THROMBOPLASTIN TIME PARTIAL: CPT

## 2020-12-12 LAB — SARS-COV-2 RNA RESP QL NAA+PROBE: NOT DETECTED

## 2020-12-14 ENCOUNTER — ANESTHESIA EVENT (OUTPATIENT)
Dept: PERIOP | Facility: HOSPITAL | Age: 50
End: 2020-12-14

## 2020-12-14 ENCOUNTER — ANESTHESIA (OUTPATIENT)
Dept: PERIOP | Facility: HOSPITAL | Age: 50
End: 2020-12-14

## 2020-12-14 ENCOUNTER — HOSPITAL ENCOUNTER (OUTPATIENT)
Facility: HOSPITAL | Age: 50
Discharge: HOME | End: 2020-12-15
Attending: SURGERY | Admitting: SURGERY

## 2020-12-14 DIAGNOSIS — K21.9 HIATAL HERNIA WITH GASTROESOPHAGEAL REFLUX: ICD-10-CM

## 2020-12-14 DIAGNOSIS — K44.9 HIATAL HERNIA WITH GASTROESOPHAGEAL REFLUX: ICD-10-CM

## 2020-12-14 PROBLEM — J30.2 SEASONAL ALLERGIES: Chronic | Status: ACTIVE | Noted: 2020-12-14

## 2020-12-14 LAB
ANION GAP SERPL CALCULATED.3IONS-SCNC: 11 MMOL/L (ref 5–15)
BASOPHILS # BLD AUTO: 0 10*3/MM3 (ref 0–0.2)
BASOPHILS NFR BLD AUTO: 0 % (ref 0–1.5)
BUN SERPL-MCNC: 16 MG/DL (ref 6–20)
BUN/CREAT SERPL: 15 (ref 7–25)
CALCIUM SPEC-SCNC: 9.6 MG/DL (ref 8.6–10.5)
CHLORIDE SERPL-SCNC: 104 MMOL/L (ref 98–107)
CO2 SERPL-SCNC: 24 MMOL/L (ref 22–29)
CREAT SERPL-MCNC: 1.07 MG/DL (ref 0.76–1.27)
DEPRECATED RDW RBC AUTO: 53.8 FL (ref 37–54)
EOSINOPHIL # BLD AUTO: 0 10*3/MM3 (ref 0–0.4)
EOSINOPHIL NFR BLD AUTO: 0 % (ref 0.3–6.2)
ERYTHROCYTE [DISTWIDTH] IN BLOOD BY AUTOMATED COUNT: 20.5 % (ref 12.3–15.4)
GFR SERPL CREATININE-BSD FRML MDRD: 73 ML/MIN/1.73
GLUCOSE SERPL-MCNC: 166 MG/DL (ref 65–99)
HCT VFR BLD AUTO: 50.9 % (ref 37.5–51)
HGB BLD-MCNC: 17 G/DL (ref 13–17.7)
LYMPHOCYTES # BLD AUTO: 0.6 10*3/MM3 (ref 0.7–3.1)
LYMPHOCYTES NFR BLD AUTO: 4 % (ref 19.6–45.3)
MCH RBC QN AUTO: 25.3 PG (ref 26.6–33)
MCHC RBC AUTO-ENTMCNC: 33.4 G/DL (ref 31.5–35.7)
MCV RBC AUTO: 75.7 FL (ref 79–97)
MONOCYTES # BLD AUTO: 0.3 10*3/MM3 (ref 0.1–0.9)
MONOCYTES NFR BLD AUTO: 2.3 % (ref 5–12)
NEUTROPHILS NFR BLD AUTO: 13 10*3/MM3 (ref 1.7–7)
NEUTROPHILS NFR BLD AUTO: 93.7 % (ref 42.7–76)
NRBC BLD AUTO-RTO: 0.1 /100 WBC (ref 0–0.2)
PLATELET # BLD AUTO: 179 10*3/MM3 (ref 140–450)
PMV BLD AUTO: 9.7 FL (ref 6–12)
POTASSIUM SERPL-SCNC: 4.3 MMOL/L (ref 3.5–5.2)
RBC # BLD AUTO: 6.73 10*6/MM3 (ref 4.14–5.8)
SODIUM SERPL-SCNC: 139 MMOL/L (ref 136–145)
WBC # BLD AUTO: 13.9 10*3/MM3 (ref 3.4–10.8)

## 2020-12-14 PROCEDURE — 63710000001 PROMETHAZINE PER 12.5 MG: Performed by: SURGERY

## 2020-12-14 PROCEDURE — C1889 IMPLANT/INSERT DEVICE, NOC: HCPCS | Performed by: SURGERY

## 2020-12-14 PROCEDURE — 25010000002 MIDAZOLAM PER 1 MG: Performed by: NURSE ANESTHETIST, CERTIFIED REGISTERED

## 2020-12-14 PROCEDURE — 25010000002 PROPOFOL 10 MG/ML EMULSION: Performed by: NURSE ANESTHETIST, CERTIFIED REGISTERED

## 2020-12-14 PROCEDURE — 25010000002 DEXAMETHASONE PER 1 MG: Performed by: NURSE ANESTHETIST, CERTIFIED REGISTERED

## 2020-12-14 PROCEDURE — 0: Performed by: SURGERY

## 2020-12-14 PROCEDURE — 25010000002 FENTANYL CITRATE (PF) 100 MCG/2ML SOLUTION: Performed by: NURSE ANESTHETIST, CERTIFIED REGISTERED

## 2020-12-14 PROCEDURE — 99203 OFFICE O/P NEW LOW 30 MIN: CPT | Performed by: INTERNAL MEDICINE

## 2020-12-14 PROCEDURE — 85025 COMPLETE CBC W/AUTO DIFF WBC: CPT | Performed by: PHYSICIAN ASSISTANT

## 2020-12-14 PROCEDURE — G0378 HOSPITAL OBSERVATION PER HR: HCPCS

## 2020-12-14 PROCEDURE — 25010000002 HYDROMORPHONE PER 4 MG: Performed by: NURSE ANESTHETIST, CERTIFIED REGISTERED

## 2020-12-14 PROCEDURE — 80048 BASIC METABOLIC PNL TOTAL CA: CPT | Performed by: PHYSICIAN ASSISTANT

## 2020-12-14 PROCEDURE — 25010000002 ONDANSETRON PER 1 MG: Performed by: SURGERY

## 2020-12-14 PROCEDURE — 43280 LAPAROSCOPY FUNDOPLASTY: CPT | Performed by: SURGERY

## 2020-12-14 PROCEDURE — 25010000003 MEPERIDINE PER 100 MG: Performed by: NURSE ANESTHETIST, CERTIFIED REGISTERED

## 2020-12-14 PROCEDURE — 25010000002 ONDANSETRON PER 1 MG: Performed by: NURSE ANESTHETIST, CERTIFIED REGISTERED

## 2020-12-14 PROCEDURE — 25010000003 CEFAZOLIN PER 500 MG: Performed by: SURGERY

## 2020-12-14 PROCEDURE — 43280 LAPAROSCOPY FUNDOPLASTY: CPT | Performed by: REGISTERED NURSE

## 2020-12-14 PROCEDURE — 25010000002 MORPHINE PER 10 MG: Performed by: SURGERY

## 2020-12-14 DEVICE — CARTRIDGE, 20 FASTENERS, 0.010" SLOT, 7.5MM WEB
Type: IMPLANTABLE DEVICE | Site: STOMACH | Status: FUNCTIONAL
Brand: 7.5MM CARTRIDGE

## 2020-12-14 RX ORDER — OXYCODONE HYDROCHLORIDE 5 MG/1
10 TABLET ORAL EVERY 4 HOURS PRN
Status: DISCONTINUED | OUTPATIENT
Start: 2020-12-14 | End: 2020-12-15 | Stop reason: HOSPADM

## 2020-12-14 RX ORDER — GLYCOPYRROLATE 1 MG/5 ML
SYRINGE (ML) INTRAVENOUS AS NEEDED
Status: DISCONTINUED | OUTPATIENT
Start: 2020-12-14 | End: 2020-12-14 | Stop reason: SURG

## 2020-12-14 RX ORDER — ONDANSETRON 2 MG/ML
INJECTION INTRAMUSCULAR; INTRAVENOUS AS NEEDED
Status: DISCONTINUED | OUTPATIENT
Start: 2020-12-14 | End: 2020-12-14 | Stop reason: SURG

## 2020-12-14 RX ORDER — OXYCODONE AND ACETAMINOPHEN 10; 325 MG/1; MG/1
1 TABLET ORAL EVERY 4 HOURS PRN
Qty: 40 TABLET | Refills: 0 | Status: SHIPPED | OUTPATIENT
Start: 2020-12-14

## 2020-12-14 RX ORDER — FENTANYL CITRATE 50 UG/ML
INJECTION, SOLUTION INTRAMUSCULAR; INTRAVENOUS AS NEEDED
Status: DISCONTINUED | OUTPATIENT
Start: 2020-12-14 | End: 2020-12-14 | Stop reason: SURG

## 2020-12-14 RX ORDER — DEXAMETHASONE SODIUM PHOSPHATE 4 MG/ML
INJECTION, SOLUTION INTRA-ARTICULAR; INTRALESIONAL; INTRAMUSCULAR; INTRAVENOUS; SOFT TISSUE AS NEEDED
Status: DISCONTINUED | OUTPATIENT
Start: 2020-12-14 | End: 2020-12-14

## 2020-12-14 RX ORDER — OXYCODONE HYDROCHLORIDE 5 MG/1
10 TABLET ORAL ONCE AS NEEDED
Status: DISCONTINUED | OUTPATIENT
Start: 2020-12-14 | End: 2020-12-14 | Stop reason: HOSPADM

## 2020-12-14 RX ORDER — ROCURONIUM BROMIDE 10 MG/ML
INJECTION, SOLUTION INTRAVENOUS AS NEEDED
Status: DISCONTINUED | OUTPATIENT
Start: 2020-12-14 | End: 2020-12-14 | Stop reason: SURG

## 2020-12-14 RX ORDER — NEOSTIGMINE METHYLSULFATE 5 MG/5 ML
SYRINGE (ML) INTRAVENOUS AS NEEDED
Status: DISCONTINUED | OUTPATIENT
Start: 2020-12-14 | End: 2020-12-14 | Stop reason: SURG

## 2020-12-14 RX ORDER — NALOXONE HCL 0.4 MG/ML
0.1 VIAL (ML) INJECTION
Status: DISCONTINUED | OUTPATIENT
Start: 2020-12-14 | End: 2020-12-14 | Stop reason: SDUPTHER

## 2020-12-14 RX ORDER — LOPERAMIDE HYDROCHLORIDE 2 MG/1
2 CAPSULE ORAL 3 TIMES DAILY PRN
Status: DISCONTINUED | OUTPATIENT
Start: 2020-12-14 | End: 2020-12-15 | Stop reason: HOSPADM

## 2020-12-14 RX ORDER — PROMETHAZINE HYDROCHLORIDE 12.5 MG/1
12.5 TABLET ORAL EVERY 6 HOURS PRN
Status: DISCONTINUED | OUTPATIENT
Start: 2020-12-14 | End: 2020-12-15 | Stop reason: HOSPADM

## 2020-12-14 RX ORDER — MIDAZOLAM HYDROCHLORIDE 1 MG/ML
INJECTION INTRAMUSCULAR; INTRAVENOUS AS NEEDED
Status: DISCONTINUED | OUTPATIENT
Start: 2020-12-14 | End: 2020-12-14 | Stop reason: SURG

## 2020-12-14 RX ORDER — ACETAMINOPHEN 650 MG/1
650 SUPPOSITORY RECTAL ONCE AS NEEDED
Status: DISCONTINUED | OUTPATIENT
Start: 2020-12-14 | End: 2020-12-14 | Stop reason: HOSPADM

## 2020-12-14 RX ORDER — AMITRIPTYLINE HYDROCHLORIDE 25 MG/1
25 TABLET, FILM COATED ORAL NIGHTLY PRN
COMMUNITY

## 2020-12-14 RX ORDER — HYDROCODONE BITARTRATE AND ACETAMINOPHEN 5; 325 MG/1; MG/1
1 TABLET ORAL EVERY 4 HOURS PRN
Status: DISCONTINUED | OUTPATIENT
Start: 2020-12-14 | End: 2020-12-15 | Stop reason: HOSPADM

## 2020-12-14 RX ORDER — ACETAMINOPHEN 650 MG/1
650 SUPPOSITORY RECTAL EVERY 4 HOURS PRN
Status: DISCONTINUED | OUTPATIENT
Start: 2020-12-14 | End: 2020-12-15 | Stop reason: HOSPADM

## 2020-12-14 RX ORDER — DEXAMETHASONE SODIUM PHOSPHATE 4 MG/ML
INJECTION, SOLUTION INTRA-ARTICULAR; INTRALESIONAL; INTRAMUSCULAR; INTRAVENOUS; SOFT TISSUE AS NEEDED
Status: DISCONTINUED | OUTPATIENT
Start: 2020-12-14 | End: 2020-12-14 | Stop reason: SURG

## 2020-12-14 RX ORDER — ONDANSETRON 4 MG/1
4 TABLET, FILM COATED ORAL EVERY 6 HOURS PRN
Status: DISCONTINUED | OUTPATIENT
Start: 2020-12-14 | End: 2020-12-15 | Stop reason: HOSPADM

## 2020-12-14 RX ORDER — ACETAMINOPHEN 325 MG/1
650 TABLET ORAL ONCE AS NEEDED
Status: DISCONTINUED | OUTPATIENT
Start: 2020-12-14 | End: 2020-12-14 | Stop reason: HOSPADM

## 2020-12-14 RX ORDER — SODIUM CHLORIDE, SODIUM LACTATE, POTASSIUM CHLORIDE, CALCIUM CHLORIDE 600; 310; 30; 20 MG/100ML; MG/100ML; MG/100ML; MG/100ML
1000 INJECTION, SOLUTION INTRAVENOUS CONTINUOUS
Status: DISCONTINUED | OUTPATIENT
Start: 2020-12-14 | End: 2020-12-15 | Stop reason: HOSPADM

## 2020-12-14 RX ORDER — ACETAMINOPHEN 325 MG/1
650 TABLET ORAL EVERY 4 HOURS PRN
Status: DISCONTINUED | OUTPATIENT
Start: 2020-12-14 | End: 2020-12-15 | Stop reason: HOSPADM

## 2020-12-14 RX ORDER — HYDROMORPHONE HCL 110MG/55ML
PATIENT CONTROLLED ANALGESIA SYRINGE INTRAVENOUS AS NEEDED
Status: DISCONTINUED | OUTPATIENT
Start: 2020-12-14 | End: 2020-12-14 | Stop reason: SURG

## 2020-12-14 RX ORDER — HYDROMORPHONE HCL 110MG/55ML
0.5 PATIENT CONTROLLED ANALGESIA SYRINGE INTRAVENOUS
Status: DISCONTINUED | OUTPATIENT
Start: 2020-12-14 | End: 2020-12-15 | Stop reason: HOSPADM

## 2020-12-14 RX ORDER — EPHEDRINE SULFATE/0.9% NACL/PF 25 MG/5 ML
SYRINGE (ML) INTRAVENOUS AS NEEDED
Status: DISCONTINUED | OUTPATIENT
Start: 2020-12-14 | End: 2020-12-14 | Stop reason: SURG

## 2020-12-14 RX ORDER — PROMETHAZINE HYDROCHLORIDE 12.5 MG/1
12.5 SUPPOSITORY RECTAL EVERY 6 HOURS PRN
Status: DISCONTINUED | OUTPATIENT
Start: 2020-12-14 | End: 2020-12-15 | Stop reason: HOSPADM

## 2020-12-14 RX ORDER — ASPIRIN 325 MG
325 TABLET ORAL DAILY
Status: DISCONTINUED | OUTPATIENT
Start: 2020-12-15 | End: 2020-12-15 | Stop reason: HOSPADM

## 2020-12-14 RX ORDER — MORPHINE SULFATE 4 MG/ML
4 INJECTION, SOLUTION INTRAMUSCULAR; INTRAVENOUS
Status: DISCONTINUED | OUTPATIENT
Start: 2020-12-14 | End: 2020-12-15 | Stop reason: HOSPADM

## 2020-12-14 RX ORDER — SODIUM CHLORIDE 9 MG/ML
100 INJECTION, SOLUTION INTRAVENOUS CONTINUOUS
Status: DISCONTINUED | OUTPATIENT
Start: 2020-12-14 | End: 2020-12-15 | Stop reason: HOSPADM

## 2020-12-14 RX ORDER — ONDANSETRON 2 MG/ML
4 INJECTION INTRAMUSCULAR; INTRAVENOUS EVERY 6 HOURS PRN
Status: DISCONTINUED | OUTPATIENT
Start: 2020-12-14 | End: 2020-12-15 | Stop reason: HOSPADM

## 2020-12-14 RX ORDER — MEPERIDINE HYDROCHLORIDE 25 MG/ML
12.5 INJECTION INTRAMUSCULAR; INTRAVENOUS; SUBCUTANEOUS
Status: DISCONTINUED | OUTPATIENT
Start: 2020-12-14 | End: 2020-12-14 | Stop reason: HOSPADM

## 2020-12-14 RX ORDER — LIDOCAINE HYDROCHLORIDE 10 MG/ML
0.5 INJECTION, SOLUTION INFILTRATION; PERINEURAL ONCE AS NEEDED
Status: DISCONTINUED | OUTPATIENT
Start: 2020-12-14 | End: 2020-12-14 | Stop reason: HOSPADM

## 2020-12-14 RX ORDER — ONDANSETRON 2 MG/ML
4 INJECTION INTRAMUSCULAR; INTRAVENOUS ONCE AS NEEDED
Status: DISCONTINUED | OUTPATIENT
Start: 2020-12-14 | End: 2020-12-14 | Stop reason: HOSPADM

## 2020-12-14 RX ORDER — PROMETHAZINE HYDROCHLORIDE 25 MG/1
25 SUPPOSITORY RECTAL ONCE AS NEEDED
Status: DISCONTINUED | OUTPATIENT
Start: 2020-12-14 | End: 2020-12-14 | Stop reason: HOSPADM

## 2020-12-14 RX ORDER — PANTOPRAZOLE SODIUM 40 MG/1
40 TABLET, DELAYED RELEASE ORAL
Status: DISCONTINUED | OUTPATIENT
Start: 2020-12-15 | End: 2020-12-15 | Stop reason: HOSPADM

## 2020-12-14 RX ORDER — PROPOFOL 10 MG/ML
VIAL (ML) INTRAVENOUS AS NEEDED
Status: DISCONTINUED | OUTPATIENT
Start: 2020-12-14 | End: 2020-12-14 | Stop reason: SURG

## 2020-12-14 RX ORDER — NALOXONE HCL 0.4 MG/ML
0.4 VIAL (ML) INJECTION
Status: DISCONTINUED | OUTPATIENT
Start: 2020-12-14 | End: 2020-12-15 | Stop reason: HOSPADM

## 2020-12-14 RX ORDER — AMITRIPTYLINE HYDROCHLORIDE 25 MG/1
25 TABLET, FILM COATED ORAL NIGHTLY PRN
Status: DISCONTINUED | OUTPATIENT
Start: 2020-12-14 | End: 2020-12-15 | Stop reason: HOSPADM

## 2020-12-14 RX ORDER — PROMETHAZINE HYDROCHLORIDE 25 MG/1
25 TABLET ORAL ONCE AS NEEDED
Status: DISCONTINUED | OUTPATIENT
Start: 2020-12-14 | End: 2020-12-14 | Stop reason: HOSPADM

## 2020-12-14 RX ORDER — LIDOCAINE HYDROCHLORIDE 20 MG/ML
INJECTION, SOLUTION EPIDURAL; INFILTRATION; INTRACAUDAL; PERINEURAL AS NEEDED
Status: DISCONTINUED | OUTPATIENT
Start: 2020-12-14 | End: 2020-12-14 | Stop reason: SURG

## 2020-12-14 RX ORDER — HYDROMORPHONE HCL 110MG/55ML
0.5 PATIENT CONTROLLED ANALGESIA SYRINGE INTRAVENOUS
Status: DISCONTINUED | OUTPATIENT
Start: 2020-12-14 | End: 2020-12-14 | Stop reason: HOSPADM

## 2020-12-14 RX ORDER — SODIUM CHLORIDE 0.9 % (FLUSH) 0.9 %
10 SYRINGE (ML) INJECTION AS NEEDED
Status: DISCONTINUED | OUTPATIENT
Start: 2020-12-14 | End: 2020-12-14 | Stop reason: HOSPADM

## 2020-12-14 RX ORDER — BUPIVACAINE HYDROCHLORIDE 5 MG/ML
INJECTION, SOLUTION PERINEURAL AS NEEDED
Status: DISCONTINUED | OUTPATIENT
Start: 2020-12-14 | End: 2020-12-14 | Stop reason: HOSPADM

## 2020-12-14 RX ADMIN — SODIUM CHLORIDE, POTASSIUM CHLORIDE, SODIUM LACTATE AND CALCIUM CHLORIDE: 600; 310; 30; 20 INJECTION, SOLUTION INTRAVENOUS at 11:59

## 2020-12-14 RX ADMIN — ROCURONIUM BROMIDE 20 MG: 10 INJECTION, SOLUTION INTRAVENOUS at 12:45

## 2020-12-14 RX ADMIN — ROCURONIUM BROMIDE 10 MG: 10 INJECTION, SOLUTION INTRAVENOUS at 13:15

## 2020-12-14 RX ADMIN — Medication 10 MG: at 12:31

## 2020-12-14 RX ADMIN — ONDANSETRON 4 MG: 2 INJECTION INTRAMUSCULAR; INTRAVENOUS at 13:50

## 2020-12-14 RX ADMIN — PROMETHAZINE HYDROCHLORIDE 12.5 MG: 12.5 TABLET ORAL at 22:43

## 2020-12-14 RX ADMIN — ROCURONIUM BROMIDE 10 MG: 10 INJECTION, SOLUTION INTRAVENOUS at 13:45

## 2020-12-14 RX ADMIN — ROCURONIUM BROMIDE 10 MG: 10 INJECTION, SOLUTION INTRAVENOUS at 13:30

## 2020-12-14 RX ADMIN — DEXAMETHASONE SODIUM PHOSPHATE 8 MG: 4 INJECTION, SOLUTION INTRAMUSCULAR; INTRAVENOUS at 12:06

## 2020-12-14 RX ADMIN — MORPHINE SULFATE 4 MG: 4 INJECTION INTRAVENOUS at 22:43

## 2020-12-14 RX ADMIN — HYDROMORPHONE HYDROCHLORIDE 0.5 MG: 2 INJECTION, SOLUTION INTRAMUSCULAR; INTRAVENOUS; SUBCUTANEOUS at 15:03

## 2020-12-14 RX ADMIN — HYDROMORPHONE HYDROCHLORIDE 0.5 MG: 2 INJECTION, SOLUTION INTRAMUSCULAR; INTRAVENOUS; SUBCUTANEOUS at 14:35

## 2020-12-14 RX ADMIN — LIDOCAINE HYDROCHLORIDE 50 MG: 20 INJECTION, SOLUTION EPIDURAL; INFILTRATION; INTRACAUDAL; PERINEURAL at 12:03

## 2020-12-14 RX ADMIN — ROCURONIUM BROMIDE 50 MG: 10 INJECTION, SOLUTION INTRAVENOUS at 12:03

## 2020-12-14 RX ADMIN — MEPERIDINE HYDROCHLORIDE 25 MG: 25 INJECTION INTRAMUSCULAR; INTRAVENOUS; SUBCUTANEOUS at 14:41

## 2020-12-14 RX ADMIN — Medication 0.8 MG: at 14:03

## 2020-12-14 RX ADMIN — LIDOCAINE HYDROCHLORIDE: 20 SOLUTION ORAL; TOPICAL at 14:57

## 2020-12-14 RX ADMIN — Medication 10 MG: at 13:43

## 2020-12-14 RX ADMIN — OXYCODONE HYDROCHLORIDE 10 MG: 5 TABLET ORAL at 15:55

## 2020-12-14 RX ADMIN — SODIUM CHLORIDE, POTASSIUM CHLORIDE, SODIUM LACTATE AND CALCIUM CHLORIDE: 600; 310; 30; 20 INJECTION, SOLUTION INTRAVENOUS at 13:35

## 2020-12-14 RX ADMIN — CEFAZOLIN SODIUM 2 G: 1 INJECTION, POWDER, FOR SOLUTION INTRAMUSCULAR; INTRAVENOUS at 12:08

## 2020-12-14 RX ADMIN — OXYCODONE HYDROCHLORIDE 10 MG: 5 TABLET ORAL at 19:45

## 2020-12-14 RX ADMIN — PROPOFOL 200 MG: 10 INJECTION, EMULSION INTRAVENOUS at 12:03

## 2020-12-14 RX ADMIN — SODIUM CHLORIDE, POTASSIUM CHLORIDE, SODIUM LACTATE AND CALCIUM CHLORIDE 1000 ML: 600; 310; 30; 20 INJECTION, SOLUTION INTRAVENOUS at 09:57

## 2020-12-14 RX ADMIN — FENTANYL CITRATE 100 MCG: 50 INJECTION, SOLUTION INTRAMUSCULAR; INTRAVENOUS at 12:02

## 2020-12-14 RX ADMIN — HYDROMORPHONE HYDROCHLORIDE 0.5 MG: 2 INJECTION INTRAMUSCULAR; INTRAVENOUS; SUBCUTANEOUS at 12:45

## 2020-12-14 RX ADMIN — HYDROMORPHONE HYDROCHLORIDE 0.5 MG: 2 INJECTION, SOLUTION INTRAMUSCULAR; INTRAVENOUS; SUBCUTANEOUS at 14:20

## 2020-12-14 RX ADMIN — CEFAZOLIN 1 G: 1 INJECTION, POWDER, FOR SOLUTION INTRAMUSCULAR; INTRAVENOUS at 20:21

## 2020-12-14 RX ADMIN — ONDANSETRON 4 MG: 2 INJECTION INTRAMUSCULAR; INTRAVENOUS at 18:35

## 2020-12-14 RX ADMIN — MIDAZOLAM 2 MG: 1 INJECTION INTRAMUSCULAR; INTRAVENOUS at 12:02

## 2020-12-14 RX ADMIN — Medication 4 MG: at 14:03

## 2020-12-14 NOTE — ANESTHESIA PROCEDURE NOTES
Airway  Urgency: elective    Date/Time: 12/14/2020 12:05 PM  Airway not difficult    General Information and Staff    Patient location during procedure: OR  Anesthesiologist: Jonatan Clifford MD  CRNA: Malinda Kelley CRNA    Indications and Patient Condition  Indications for airway management: airway protection    Preoxygenated: yes  MILS not maintained throughout  Mask difficulty assessment: 1 - vent by mask    Final Airway Details  Final airway type: endotracheal airway      Successful airway: ETT  Cuffed: yes   Successful intubation technique: direct laryngoscopy  Facilitating devices/methods: intubating stylet  Endotracheal tube insertion site: oral  Blade: Theodore  Blade size: 4  ETT size (mm): 8.0  Cormack-Lehane Classification: grade I - full view of glottis  Placement verified by: chest auscultation and capnometry   Measured from: teeth  ETT/EBT  to teeth (cm): 22  Number of attempts at approach: 1  Assessment: lips, teeth, and gum same as pre-op and atraumatic intubation

## 2020-12-14 NOTE — H&P
Subjective   Derick Miller is a 50 y.o. male.     History of present illness  Derick is a pleasant 50-year-old patient seen in the office at the request of Dr. Bee for medium sized hiatal hernia with significant reflux disease.  Patient has decided he is does not want to have to take medicine the rest of his life so is wanting to proceed with surgery to correct the hernia and create a new valve to stop the reflux.  We have discussed with him in the office that that will require 5 small incisions and an overnight stay in the hospital.  Of also discussed with him that with his prior history of colon resection that he may be so full of adhesions and scar tissue that we would have to convert to an open.  He tells me he would rather not convert to an open unless it is a lifesaving measure.    Past Medical History:   Diagnosis Date   • Dysphagia    • GERD (gastroesophageal reflux disease)    • Hiatal hernia    • Loose stools    • Polycythemia     saida pierce   • Ulcerative colitis (CMS/HCC)        Past Surgical History:   Procedure Laterality Date   • COLON SURGERY  1996    total colectomy /resection   • ENDOSCOPY      dilation   • LAPAROSCOPIC ASSISSTED TOTAL COLECTOMY W/ J-POUCH     • REVISION [facility]       [unfilled]    Allergies   Allergen Reactions   • Flonase  [Fluticasone] Anaphylaxis   • Ciprofloxacin Other (See Comments)       No family history on file.    Social History     Socioeconomic History   • Marital status:      Spouse name: Not on file   • Number of children: Not on file   • Years of education: Not on file   • Highest education level: Not on file   Tobacco Use   • Smoking status: Never Smoker   • Smokeless tobacco: Never Used   Substance and Sexual Activity   • Alcohol use: Yes     Comment: occ   • Drug use: Never   • Sexual activity: Defer       The following portions of the patient's history were reviewed and updated as appropriate: allergies, current medications,  past family history, past medical history, past social history, past surgical history and problem list.    Objective   Complete review of systems is done and unremarkable with exception of the chief complaint    Physical exam shows a pleasant 50-year-old male.  HEENT is negative.  Heart is regular.  Lungs are clear.  Abdomen is soft nontender without mass or hernia.  He has a large midline scar.  It extends from 3 inches above the umbilicus to the pubis.  Extremities show equal range of motion in the upper and lower extremities extremities.  He has symmetrical strength and usage.  Neuro shows no obvious focal deficit.    Impression hiatal hernia type I with severe reflux    Plan: Laparoscopic hiatal hernia repair with transoral fundoplication.    Assessment/Plan                  Tom Garcia,   12/14/2020  09:19 EST

## 2020-12-14 NOTE — ANESTHESIA POSTPROCEDURE EVALUATION
Patient: Derick Miller    Procedure Summary     Date: 12/14/20 Room / Location: [facility] OR 08 / [facility] MAIN OR    Anesthesia Start: 1159 Anesthesia Stop: 1416    Procedure: HIATAL HERNIA REPAIR LAPAROSCOPIC WITH TRANSORAL INCISIONLESS FUNDOPLICATION (N/A Abdomen) Diagnosis:       Hiatal hernia with gastroesophageal reflux      (Hiatal hernia with gastroesophageal reflux [K21.9, K44.9])    Surgeon: Tom Garcia DO Provider: Jonatan Clifford MD    Anesthesia Type: general ASA Status: 2          Anesthesia Type: general    Vitals  Vitals Value Taken Time   /79 12/14/20 1514   Temp 98.4 °F (36.9 °C) 12/14/20 1513   Pulse 62 12/14/20 1518   Resp 11 12/14/20 1513   SpO2 92 % 12/14/20 1518   Vitals shown include unvalidated device data.        Post Anesthesia Care and Evaluation    Pain management: adequate  Airway patency: patent  Anesthetic complications: No anesthetic complications  PONV Status: controlled  Cardiovascular status: acceptable  Respiratory status: acceptable  Hydration status: acceptable

## 2020-12-14 NOTE — CONSULTS
[facility] Medicine Services      Patient Name: Derick Miller  : 1970  MRN: [id number]  Primary Care Physician: Provider, No Known  Date of admission: 2020    Patient Care Team:  Provider, No Known as PCP - General          Subjective   History Present Illness     Chief Complaint: GERD with moderate hiatal hernia      Mr. Miller is a 50 y.o. seasonal allergies, GERD, hiatal hernia who presents to [facility] for elective hiatal hernia repair due to longstanding history of GERD with previously noted moderate sized hiatal hernia.  Patient underwent successful laparoscopic hiatal hernia repair with transoral incision less fundoplication on 2020 with no complications and minimal blood loss reported.    Most recent labs from 2020 are notable for hemoglobin of 17.4 with a decreased MCV.  Remainder of CBC and CMP was generally unremarkable.  Preop EKG shows sinus bradycardia 55 without obvious acute ST changes or ectopy and a QTC of 425 ms.  Preprocedure COVID-19 test was negative.    20  Patient is complaining of abdominal pain and is due to have medication soon.  Pt was in his usual state of health prior to the procedure with no other new or specific complaints.      Review of Systems   Constitution: Negative for chills and fever.   HENT: Negative.    Cardiovascular: Negative.    Respiratory: Negative.    Skin: Negative.    Musculoskeletal: Negative.    Gastrointestinal: Positive for abdominal pain and nausea. Negative for constipation, diarrhea and vomiting.   Genitourinary: Negative.    Neurological: Negative.    Psychiatric/Behavioral: Negative.    All other systems reviewed and are negative.          Personal History     Past Medical History:   Past Medical History:   Diagnosis Date   • Dysphagia    • GERD (gastroesophageal reflux disease)    • Hiatal hernia    • Loose stools    • Polycythemia     saida pierce   • Ulcerative colitis (CMS/[facility])         Surgical History:      Past Surgical History:   Procedure Laterality Date   • COLON SURGERY  1996    total colectomy /resection   • ENDOSCOPY      dilation   • LAPAROSCOPIC ASSISSTED TOTAL COLECTOMY W/ J-POUCH     • REVISION / TAKEDOWN COLOSTOMY      j-poucvh           Family History: family history is not on file. Otherwise pertinent FHx was reviewed and unremarkable.     Social History:  reports that he has never smoked. He has never used smokeless tobacco. He reports current alcohol use. He reports that he does not use drugs.      Medications:  Prior to Admission medications    Medication Sig Start Date End Date Taking? Authorizing Provider   amitriptyline (ELAVIL) 25 MG tablet Take 25 mg by mouth At Night As Needed for Sleep.   Yes Esvin Sanders MD   aspirin 325 MG tablet Take 325 mg by mouth Daily. To stop 5 days prior per surgeon    Esvin Sanders MD   cetirizine (zyrTEC) 10 MG tablet Take 10 mg by mouth every night at bedtime.    Esvin Sanders MD   loperamide (Imodium A-D) 2 MG tablet Take 2 mg by mouth 3 (Three) Times a Day As Needed for Diarrhea.    Esvin Sanders MD   montelukast (SINGULAIR) 10 MG tablet Take 10 mg by mouth Every Night.    Esvin Sanders MD   Multiple Vitamins-Minerals (MULTIVITAMIN ADULT PO) Take  by mouth. Stop now for surgery    Esvin Sanders MD   omeprazole (priLOSEC) 40 MG capsule Take 40 mg by mouth Daily. Take dos 9/24/20   Esvin Sanders MD   oxyCODONE-acetaminophen (Percocet)  MG per tablet Take 1 tablet by mouth Every 4 (Four) Hours As Needed for Moderate Pain  for up to 40 doses. 12/14/20   Tom Garcia DO       Allergies:    Allergies   Allergen Reactions   • Flonase  [Fluticasone] Anaphylaxis   • Ciprofloxacin Other (See Comments)       Objective   Objective     Vital Signs  Temp:  [97.1 °F (36.2 °C)-98.4 °F (36.9 °C)] 98.4 °F (36.9 °C)  Heart Rate:  [55-75] 55  Resp:  [11-14] 12  BP: ()/(62-90)  121/80  SpO2:  [91 %-96 %] 94 %  on  Flow (L/min):  [2-6] 2;   Device (Oxygen Therapy): room air  Body mass index is 29.18 kg/m².    General: well-developed and well-nourished, NAD  HEENT: NC/AT, EOMI, PERRLA  Heart: RRR. No murmur   Chest: CTAB, no w/r/r, normal respiratory effort  Abdominal: Epigastric tenderness, mild distention. Bowel sounds present  Musculoskeletal: Normal ROM.  No edema. No calf tenderness.  Neurological: AAOx3, no focal deficits  Skin: Skin is warm and dry. No rash  Psychiatric: Normal mood and affect.    Results Review:  I have personally reviewed most recent cardiac tracings, lab results and radiology images and interpretations and agree with findings.    Results from last 7 days   Lab Units 12/11/20 0622   WBC 10*3/mm3 6.17   HEMOGLOBIN g/dL 17.4   HEMATOCRIT % 52.6*   PLATELETS 10*3/mm3 162   INR  1.04     Results from last 7 days   Lab Units 12/11/20 0622   SODIUM mmol/L 132*   POTASSIUM mmol/L 3.9   CHLORIDE mmol/L 100   CO2 mmol/L 25.3   BUN mg/dL 19   CREATININE mg/dL 1.06   GLUCOSE mg/dL 91   CALCIUM mg/dL 9.2   ALT (SGPT) U/L 42*   AST (SGOT) U/L 27     Estimated Creatinine Clearance: 99.8 mL/min (by C-G formula based on SCr of 1.06 mg/dL).  Brief Urine Lab Results     None          Microbiology Results (last 10 days)     Procedure Component Value - Date/Time    COVID PRE-OP / PRE-PROCEDURE SCREENING ORDER (NO ISOLATION) - Swab, Nasopharynx [[id number]] Collected: 12/11/20 0622    Lab Status: Final result Specimen: Swab from Nasopharynx Updated: 12/12/20 1500    Narrative:      The following orders were created for panel order COVID PRE-OP / PRE-PROCEDURE SCREENING ORDER (NO ISOLATION) - Swab, Nasopharynx.  Procedure                               Abnormality         Status                     ---------                               -----------         ------                     COVID-19 PCR, [facility]...[[id number]]                      Final result                 Please view  results for these tests on the individual orders.    COVID-19 PCR, [facility], NP SWAB IN [facility] VIRAL TRANSPORT MEDIA 24-30 HR TAT - Swab, Nasopharynx [[id number]] Collected: 12/11/20 0622    Lab Status: Final result Specimen: Swab from Nasopharynx Updated: 12/12/20 1500     SARS-CoV-2 SISI Not Detected          ECG/EMG Results (most recent)     None                    No radiology results for the last 7 days      Estimated Creatinine Clearance: 99.8 mL/min (by C-G formula based on SCr of 1.06 mg/dL).    Assessment/Plan   Assessment/Plan       Active Hospital Problems    Diagnosis  POA   • **Hiatal hernia with gastroesophageal reflux [K21.9, K44.9]  Yes     Priority: High   • Seasonal allergies [J30.2]  Yes     Priority: Low      Resolved Hospital Problems   No resolved problems to display.     Hiatal hernia with recent laparoscopic repair and history of GERD  -Patient underwent successful laparoscopic hiatal hernia repair on 12/14/2020  -Continue full liquid diet, cefazolin and fluid infusions per surgery  -As needed pain and nausea medication noted  -Check CBC and BMP    Seasonal allergies  -Zyrtec and Singulair    Depression?  -Continue Elavil          VTE Prophylaxis -SCDs  Mechanical Order History:      Ordered        12/14/20 1545  Place Sequential Compression Device  Once         12/14/20 1545  Place Sequential Compression Device  Once         12/14/20 1545  Maintain Sequential Compression Device  Continuous                 Pharmalogical Order History:     None          CODE STATUS: Full  Code Status and Medical Interventions:   Ordered at: 12/14/20 1545     Code Status:    CPR     Medical Interventions (Level of Support Prior to Arrest):    Full       This patient has been examined wearing appropriate Personal Protective Equipment. 12/14/20      I discussed the patient's findings and my recommendations with patient and nursing staff.      Signature: Electronically signed by Jenny Marin MD, 12/14/20,  10:41 PM EST.      [facility] Hospitalist Team

## 2020-12-14 NOTE — ANESTHESIA PREPROCEDURE EVALUATION
Anesthesia Evaluation     Patient summary reviewed and Nursing notes reviewed   history of anesthetic complications:  NPO Solid Status: > 8 hours  NPO Liquid Status: > 8 hours           Airway   Mallampati: II  TM distance: >3 FB  Neck ROM: full  No difficulty expected  Dental      Pulmonary    Cardiovascular   Exercise tolerance: good (4-7 METS)        Neuro/Psych  GI/Hepatic/Renal/Endo    (+)  hiatal hernia, GERD,      Musculoskeletal     Abdominal    Substance History      OB/GYN          Other        ROS/Med Hx Other: H/o asp with endoscopy                  Anesthesia Plan    ASA 2     general   Rapid sequence(Cric press)  intravenous induction     Anesthetic plan, all risks, benefits, and alternatives have been provided, discussed and informed consent has been obtained with: patient.

## 2020-12-14 NOTE — OP NOTE
HIATAL HERNIA REPAIR LAPAROSCOPIC WITH TRANSORAL INCISIONLESS FUNDOPLICATION  Procedure Report    Patient Name:  Derick Miller  YOB: 1970    Date of Surgery:  12/14/2020     Indications: Type I sliding hiatal hernia with reflux    Pre-op Diagnosis:   Hiatal hernia with gastroesophageal reflux [K21.9, K44.9]       Post-Op Diagnosis Codes:     * Hiatal hernia with gastroesophageal reflux [K21.9, K44.9], same, intra-abdominal adhesions from previous surgery    Procedure/CPT® Codes:      Procedure(s):  HIATAL HERNIA REPAIR LAPAROSCOPIC WITH TRANSORAL INCISIONLESS FUNDOPLICATION, lysis of adhesions    Staff:  Surgeon(s):  Tom Garcia DO    Assistant: Sherlyn Gabriel RNFA    Anesthesia: General    Anesthetist: Malinda Kelley CRNA    Estimated Blood Loss: minimal    Implants:    Implant Name Type Inv. Item Serial No.  Lot No. LRB No. Used Action   CARTRDG FASTENER GI SEROSAFUSE 7.5MM EA/20 - [id number] Implant CARTRDG FASTENER GI SEROSAFUSE 7.5MM EA/20  ENDOGASTRIC [id number] N/A 1 Implanted       Specimen:          None        Findings: Intra-abdominal adhesions from previous surgery, type I sliding hiatal hernia 4 cm    Complications: None    Description of Procedure: Derick is a 50-year-old seen in the office at the request of Dr. Bee for type I sliding hiatal hernia with severe reflux.  Patient is refractory to medicine is hoping to get off medicine by undergoing surgery.  We discussed options and he is elected to proceed with a lap hiatal hernia repair with transoral fundoplication.  We discussed the procedure and risks in detail.  He understands those accepts those and wishes to proceed.    Patient was taken operating room placed in the supine position.  General was done by Malinda Kelley CRNA and covering anesthesiologist.  Timeout done and identity verified.  Abdomen prepped and draped after 3-minute dry time.  Proposed incision sites were outlined with a skin marker and  we began in the left upper quadrant.  Incision was made there and varies needle passed through all layers.  Saline drop test was done was negative and the abdomen insufflated with CO2 to approximately 15 mmHg pressure.  Disposable 11 mm trocar and sheath was passed and the laparoscope introduced confirming intra-abdominal positioning with no injury and confirming small bowel was densely adherent to the lower portion of the abdominal wall from his previous surgeries.  We then placed 2 5 mm ports in the right abdominal wall under direct vision and then switched to a 5 mm camera and using Endo chi adhesions from the bowel to the anterior abdominal wall were taken down sharply.  Once the abdominal wall was cleared of all the bowel then we returned and placed the other 4 ports under direct vision.  And the [employer] liver retractor was then positioned.  He was placed in steep reverse Trendelenburg position and rolled to the right.  Stomach was grasped and placed on downward traction and the gastrohepatic ligament was opened with a [place] LigaSure device.  This was carried up to near the diaphragm.  Peritoneum overlying the right shun was then scored and the mediastinal plane entered.  The esophagus was dissected circumferentially from its attachments to allow length to get below the diaphragm.  Once that was accomplished then the left shun was dissected out and identified then the crura were approximated using the Endo Stitch device.  3 stitches were used to close down the defect appropriately.  We then used an Consano suture passer and a 0 Vicryl stitch was placed through both of the 11 mm port sites under direct vision to approximate the fascia.  CO2 was then allowed to escape to the atmosphere the liver retractor removed and fascial stitches tied down.  Skin was then closed with 4-0 Vicryl in a subcu manner throughout sterile OpSite dressings applied over Mastisol and Steri-Strips.  He was then taken out of  banana boot lithotomy position and turned to the left lateral decub position on the blue positioning pillow for the transoral fundoplication portion.  Bony prominences were padded he was secured to the table with wide tape and leg straps.  The Olympus upper pan video scope was then passed by the cricopharyngeus into the esophagus stomach and duodenum.  Duodenum was normal.  Scope was drawn back into the stomach.  Stomach walls appeared unremarkable the scope was retroflexed upon itself viewing the EG junction.  Hiatal hernia was now nicely closed down.  Z-line was inspected it was found to be at 45 cm from the incisors.  Scope was then withdrawn and we sequentially dilated his esophagus from 48-58 [place] using Pride type dilators.  The esophagus to device was then opened and prepared by lubricating it with olive oil at the appropriate points.  Scope was then lubricated with olive oil and it was passed down the channel and the device and then under direct vision the scope and the device were passed down the esophagus and on into the stomach.  Scope was retroflexed upon itself and the tissue mold visualized and we began firings at 6:00.  2 firings were done at 6 to between 7 and 8 to between 8 and 9 to between 9 and 10 and 2 between 10 and 11.  We then did 2 firings 5 to between 3 and 4 to between 2 and 3 to between 1 and 2.  The remainder the 20 firings were then done between 5 and 7:00.  Open the device were then removed under direct vision.  We then went back down with the scope to visualize the bowel to irrigate the stomach and suctioned free as much of the fluid and some blood from the procedure.  There was no ongoing bleeding.  The valve looked very robust.  The scope was then withdrawn viewing the fundus and the esophageal mucosa on the way out assuring there was no injury.  He tolerated the procedure well was awakened and transferred to recovery in satisfactory condition.  The final sponge instrument and  needle counts were correct.    Assistant: Sherlyn Gabriel RNFA  was responsible for performing the following activities: Suturing, Closing, Placing Dressing and Held/Positioned Camera and their skilled assistance was necessary for the success of this case.    Tom Garcia,      Date: 12/14/2020  Time: 14:10 EST

## 2020-12-14 NOTE — PLAN OF CARE
Goal Outcome Evaluation:  Plan of Care Reviewed With: patient, spouse   Patient arrived to the unit- tx plan initiated

## 2020-12-15 VITALS
HEIGHT: 72 IN | DIASTOLIC BLOOD PRESSURE: 92 MMHG | HEART RATE: 69 BPM | WEIGHT: 215.17 LBS | OXYGEN SATURATION: 93 % | BODY MASS INDEX: 29.14 KG/M2 | TEMPERATURE: 98.1 F | RESPIRATION RATE: 18 BRPM | SYSTOLIC BLOOD PRESSURE: 135 MMHG

## 2020-12-15 LAB
ANION GAP SERPL CALCULATED.3IONS-SCNC: 8 MMOL/L (ref 5–15)
BASOPHILS # BLD AUTO: 0 10*3/MM3 (ref 0–0.2)
BASOPHILS NFR BLD AUTO: 0.4 % (ref 0–1.5)
BUN SERPL-MCNC: 14 MG/DL (ref 6–20)
BUN/CREAT SERPL: 12.5 (ref 7–25)
CALCIUM SPEC-SCNC: 9.2 MG/DL (ref 8.6–10.5)
CHLORIDE SERPL-SCNC: 100 MMOL/L (ref 98–107)
CO2 SERPL-SCNC: 29 MMOL/L (ref 22–29)
CREAT SERPL-MCNC: 1.12 MG/DL (ref 0.76–1.27)
DEPRECATED RDW RBC AUTO: 52.9 FL (ref 37–54)
EOSINOPHIL # BLD AUTO: 0 10*3/MM3 (ref 0–0.4)
EOSINOPHIL NFR BLD AUTO: 0.1 % (ref 0.3–6.2)
ERYTHROCYTE [DISTWIDTH] IN BLOOD BY AUTOMATED COUNT: 20.3 % (ref 12.3–15.4)
GFR SERPL CREATININE-BSD FRML MDRD: 69 ML/MIN/1.73
GLUCOSE SERPL-MCNC: 121 MG/DL (ref 65–99)
HCT VFR BLD AUTO: 46.6 % (ref 37.5–51)
HGB BLD-MCNC: 15.6 G/DL (ref 13–17.7)
LYMPHOCYTES # BLD AUTO: 1 10*3/MM3 (ref 0.7–3.1)
LYMPHOCYTES NFR BLD AUTO: 8.6 % (ref 19.6–45.3)
MCH RBC QN AUTO: 25.7 PG (ref 26.6–33)
MCHC RBC AUTO-ENTMCNC: 33.4 G/DL (ref 31.5–35.7)
MCV RBC AUTO: 76.9 FL (ref 79–97)
MONOCYTES # BLD AUTO: 1.2 10*3/MM3 (ref 0.1–0.9)
MONOCYTES NFR BLD AUTO: 10.4 % (ref 5–12)
NEUTROPHILS NFR BLD AUTO: 80.5 % (ref 42.7–76)
NEUTROPHILS NFR BLD AUTO: 9.5 10*3/MM3 (ref 1.7–7)
NRBC BLD AUTO-RTO: 0.1 /100 WBC (ref 0–0.2)
PLATELET # BLD AUTO: 173 10*3/MM3 (ref 140–450)
PMV BLD AUTO: 9.3 FL (ref 6–12)
POTASSIUM SERPL-SCNC: 4.1 MMOL/L (ref 3.5–5.2)
RBC # BLD AUTO: 6.07 10*6/MM3 (ref 4.14–5.8)
SODIUM SERPL-SCNC: 137 MMOL/L (ref 136–145)
WBC # BLD AUTO: 11.8 10*3/MM3 (ref 3.4–10.8)

## 2020-12-15 PROCEDURE — G0378 HOSPITAL OBSERVATION PER HR: HCPCS

## 2020-12-15 PROCEDURE — 25010000002 MORPHINE PER 10 MG: Performed by: SURGERY

## 2020-12-15 PROCEDURE — 80048 BASIC METABOLIC PNL TOTAL CA: CPT | Performed by: PHYSICIAN ASSISTANT

## 2020-12-15 PROCEDURE — 85025 COMPLETE CBC W/AUTO DIFF WBC: CPT | Performed by: PHYSICIAN ASSISTANT

## 2020-12-15 PROCEDURE — 25010000003 CEFAZOLIN PER 500 MG: Performed by: SURGERY

## 2020-12-15 PROCEDURE — 99214 OFFICE O/P EST MOD 30 MIN: CPT | Performed by: INTERNAL MEDICINE

## 2020-12-15 PROCEDURE — 25010000002 ONDANSETRON PER 1 MG: Performed by: SURGERY

## 2020-12-15 RX ADMIN — CEFAZOLIN 1 G: 1 INJECTION, POWDER, FOR SOLUTION INTRAMUSCULAR; INTRAVENOUS at 04:54

## 2020-12-15 RX ADMIN — MORPHINE SULFATE 4 MG: 4 INJECTION INTRAVENOUS at 02:51

## 2020-12-15 RX ADMIN — PANTOPRAZOLE SODIUM 40 MG: 40 TABLET, DELAYED RELEASE ORAL at 04:54

## 2020-12-15 RX ADMIN — ASPIRIN 325 MG ORAL TABLET 325 MG: 325 PILL ORAL at 09:17

## 2020-12-15 RX ADMIN — ONDANSETRON 4 MG: 2 INJECTION INTRAMUSCULAR; INTRAVENOUS at 02:50

## 2020-12-15 RX ADMIN — MORPHINE SULFATE 4 MG: 4 INJECTION INTRAVENOUS at 06:59

## 2020-12-15 RX ADMIN — MORPHINE SULFATE 4 MG: 4 INJECTION INTRAVENOUS at 13:25

## 2020-12-15 RX ADMIN — OXYCODONE HYDROCHLORIDE 10 MG: 5 TABLET ORAL at 09:17

## 2020-12-15 RX ADMIN — OXYCODONE HYDROCHLORIDE 10 MG: 5 TABLET ORAL at 16:27

## 2020-12-15 NOTE — PLAN OF CARE
Goal Outcome Evaluation:  Plan of Care Reviewed With: patient  Progress: improving   Patient to discharge tonight

## 2020-12-15 NOTE — PLAN OF CARE
Pt resting well. Pt complaining of pain and nausea, treated per MAR. Pt ambulatory on own. Will continue to monitor.     Problem: Adult Inpatient Plan of Care  Goal: Plan of Care Review  Outcome: Ongoing, Progressing  Flowsheets (Taken 12/15/2020 0109)  Progress: improving  Plan of Care Reviewed With: patient

## 2020-12-15 NOTE — PROGRESS NOTES
Discharge Planning Assessment  [facility]     Patient Name: Derick Miller  MRN: [id number]  Today's Date: 12/15/2020    Admit Date: 12/14/2020    Discharge Needs Assessment     Row Name 12/15/20 1314       Living Environment    Lives With  spouse    Current Living Arrangements  home/apartment/condo    Primary Care Provided by  self    Provides Primary Care For  no one    Family Caregiver if Needed  spouse    Quality of Family Relationships  helpful    Able to Return to Prior Arrangements  yes       Resource/Environmental Concerns    Resource/Environmental Concerns  none    Transportation Concerns  car, none       Transition Planning    Patient/Family Anticipates Transition to  home with family    Patient/Family Anticipated Services at Transition  none    Transportation Anticipated  family or friend will provide       Discharge Needs Assessment    Readmission Within the Last 30 Days  no previous admission in last 30 days    Equipment Currently Used at Home  none    Concerns to be Addressed  denies needs/concerns at this time    Anticipated Changes Related to Illness  none    Equipment Needed After Discharge  none        Discharge Plan     Row Name 12/15/20 1314       Plan    Plan  D/C Plan: Home with family.    Patient/Family in Agreement with Plan  yes    Plan Comments   spoke to patient and spouse at bedside wearing mask and goggles and keeping distance greater than 6 feet and spent less than 15 minutes in room. Patient lives with spouse, is IADLs and drives. Patient does not currently have a PCP-CM provided [phone] information to spouse. Pharmacy verified-denies any difficulty affording meds. Denies any d/c needs at this time. Barrier to D/C: s/p hernia repair, advancing diet as tolerated.          Expected Discharge Date and Time     Expected Discharge Date Expected Discharge Time    Dec 16, 2020         Demographic Summary     Row Name 12/15/20 1314       General Information    Admission Type   observation    Arrived From  operating room    Referral Source  admission list    Reason for Consult  discharge planning    Preferred Language  English     Used During This Interaction  no        Functional Status     Row Name 12/15/20 1314       Functional Status    Usual Activity Tolerance  good    Current Activity Tolerance  good       Functional Status, IADL    Medications  independent    Meal Preparation  independent    Housekeeping  independent    Laundry  independent    Shopping  independent       Mental Status    General Appearance WDL  WDL       Mental Status Summary    Recent Changes in Mental Status/Cognitive Functioning  no changes          Breanna Koch

## 2020-12-15 NOTE — PROGRESS NOTES
"      [facility] Daily Progress Note          Hospitalist Team  LOS 0 days      Patient Care Team:  Provider, No Known as PCP - General    Patient Location: 4123/1      Subjective   Subjective     Chief Complaint / Subjective  Elective hiatal hernia repair.      Brief Synopsis of Hospital Course/HPI  Mr. Miller is a 50 y.o. seasonal allergies, GERD, hiatal hernia who presents to [facility] for elective hiatal hernia repair due to longstanding history of GERD with previously noted moderate sized hiatal hernia.  Patient underwent successful laparoscopic hiatal hernia repair with transoral incision less fundoplication on 12/14/2020 with no complications and minimal blood loss reported.     Most recent labs from 12/11/2020 are notable for hemoglobin of 17.4 with a decreased MCV.  Remainder of CBC and CMP was generally unremarkable.  Preop EKG shows sinus bradycardia 55 without obvious acute ST changes or ectopy and a QTC of 425 ms.  Preprocedure COVID-19 test was negative.     12/14/20  Patient is complaining of abdominal pain and is due to have medication soon.  Pt was in his usual state of health prior to the procedure with no other new or specific complaints.    12/15/20  Pt seen and examined.  Able to tolerate liquids. He is belching a lot, but has not yet had a bowel motion or passed any flatus.      Review of Systems   Gastrointestinal: Positive for abdominal pain.   All other systems reviewed and are negative.        Objective   Objective      Vital Signs  Temp:  [97.6 °F (36.4 °C)-98.4 °F (36.9 °C)] 98 °F (36.7 °C)  Heart Rate:  [55-75] 64  Resp:  [11-19] 19  BP: ()/(61-84) 145/79  Oxygen Therapy  SpO2: 91 %  Pulse Oximetry Type: Intermittent  Device (Oxygen Therapy): room air  Flow (L/min): 2  Flowsheet Rows      First Filed Value   Admission Height  182.9 cm (72\") Documented at 12/14/2020 0857   Admission Weight  97.6 kg (215 lb 2.7 oz) Documented at " 12/14/2020 0857        Intake & Output (last 3 days)       12/12 0701 - 12/13 0700 12/13 0701 - 12/14 0700 12/14 0701 - 12/15 0700 12/15 0701 - 12/16 0700    P.O.   320     I.V. (mL/kg)   1520 (15.6)     IV Piggyback   20     Total Intake(mL/kg)   1860 (19.1)     Urine (mL/kg/hr)   525     Total Output   525     Net   +1335                 Lines, Drains & Airways    Active LDAs     Name:   Placement date:   Placement time:   Site:   Days:    Peripheral IV 12/14/20 0953 Anterior;Left Forearm   12/14/20    0953    Forearm   1    Peripheral IV 12/14/20 0954 Left;Posterior Forearm   12/14/20    0954    Forearm   1                  Physical Exam:  General: well-developed and well-nourished, NAD  HEENT: NC/AT, EOMI, PERRLA  Heart: RRR. No murmur   Chest: CTAB, no w/r/r, normal respiratory effort  Abdominal: mild distention, epigastric tenderness. Bowel sounds present  Musculoskeletal: Normal ROM.  No edema. No calf tenderness.  Neurological: AAOx3, no focal deficits  Skin: Skin is warm and dry. No rash  Psychiatric: Normal mood and affect.       Wounds (last 24 hours)      LDA Wound     Row Name 12/15/20 1130 12/15/20 0729 12/14/20 1945       Wound 12/14/20 1225 abdomen Incision    Wound - Properties Group Placement Date: 12/14/20  -LT Placement Time: 1225  -LT Location: abdomen  -LT Primary Wound Type: Incision  -LT    Dressing Appearance  dry;intact  -CS  dry;intact  -CS  dry;intact  -MS    Closure  Liquid skin adhesive  -CS  Liquid skin adhesive  -CS  Adhesive closure strips;Other (Comment) gauze  -MS    Base  --  clean;dry  -CS  dressing in place, unable to visualize  -MS    Drainage Amount  --  scant  -CS  scant  -MS    Retired Wound - Properties Group Date first assessed: 12/14/20  -LT Time first assessed: 1225  -LT Location: abdomen  -LT Primary Wound Type: Incision  -LT    Row Name 12/14/20 1555 12/14/20 1513 12/14/20 1458       Wound 12/14/20 1225 abdomen Incision    Wound - Properties Group Placement Date:  12/14/20  -LT Placement Time: 1225  -LT Location: abdomen  -LT Primary Wound Type: Incision  -LT    Dressing Appearance  dry;intact;no drainage  -CS  dry;intact;no drainage  -SHAMA  dry;intact;no drainage  -SHAMA    Closure  Adhesive closure strips;Other (Comment) gauze  -CS  --  --    Base  dressing in place, unable to visualize  -CS  --  --    Drainage Characteristics/Odor  bleeding controlled  -CS  --  --    Drainage Amount  scant  -CS  --  --    Retired Wound - Properties Group Date first assessed: 12/14/20  -LT Time first assessed: 1225  -LT Location: abdomen  -LT Primary Wound Type: Incision  -LT    Row Name 12/14/20 1443 12/14/20 1428 12/14/20 1417       Wound 12/14/20 1225 abdomen Incision    Wound - Properties Group Placement Date: 12/14/20  -LT Placement Time: 1225  -LT Location: abdomen  -LT Primary Wound Type: Incision  -LT    Dressing Appearance  dry;intact;no drainage  -SHAMA  dry;intact;no drainage  -SHAMA  --    Closure  --  --  Other (Comment) mastisol, steristrips, 4x4, tegaderm x7  -LT    Retired Wound - Properties Group Date first assessed: 12/14/20  -LT Time first assessed: 1225  -LT Location: abdomen  -LT Primary Wound Type: Incision  -LT    Row Name 12/14/20 1413 12/14/20 1317          Wound 12/14/20 1225 abdomen Incision    Wound - Properties Group Placement Date: 12/14/20  -LT Placement Time: 1225  -LT Location: abdomen  -LT Primary Wound Type: Incision  -LT    Dressing Appearance  dry;intact;no drainage  -SHAMA  --     Closure  --  Other (Comment) mastisol, steris, tegaderm x 7  -LT     Retired Wound - Properties Group Date first assessed: 12/14/20  -LT Time first assessed: 1225  -LT Location: abdomen  -LT Primary Wound Type: Incision  -LT      User Key  (r) = Recorded By, (t) = Taken By, (c) = Cosigned By    Initials Name Provider Type    Sherin Kendrick RN Registered Nurse    Raquel Blake, RN Registered Nurse    Katerina Damon RN Registered Nurse    Jennifer Gimenez RN Registered  Nurse          Procedures:  Procedure(s):  HIATAL HERNIA REPAIR LAPAROSCOPIC WITH TRANSORAL INCISIONLESS FUNDOPLICATION    Procedure(s):  HIATAL HERNIA REPAIR LAPAROSCOPIC WITH TRANSORAL INCISIONLESS FUNDOPLICATION  -------------------       Results Review:     I reviewed the patient's new clinical results.    Results from last 7 days   Lab Units 12/15/20  0646 12/14/20  1916 12/11/20  0622   WBC 10*3/mm3 11.80* 13.90* 6.17   HEMOGLOBIN g/dL 15.6 17.0 17.4   HEMATOCRIT % 46.6 50.9 52.6*   PLATELETS 10*3/mm3 173 179 162     Results from last 7 days   Lab Units 12/15/20  0646 12/14/20  1916 12/11/20  0622   SODIUM mmol/L 137 139 132*   POTASSIUM mmol/L 4.1 4.3 3.9   CHLORIDE mmol/L 100 104 100   CO2 mmol/L 29.0 24.0 25.3   BUN mg/dL 14 16 19   CREATININE mg/dL 1.12 1.07 1.06   GLUCOSE mg/dL 121* 166* 91   ALBUMIN g/dL  --   --  4.00   BILIRUBIN mg/dL  --   --  1.0   ALK PHOS U/L  --   --  74   AST (SGOT) U/L  --   --  27   ALT (SGPT) U/L  --   --  42*   CALCIUM mg/dL 9.2 9.6 9.2     Cr Clearance Estimated Creatinine Clearance: 94.5 mL/min (by C-G formula based on SCr of 1.12 mg/dL).    Coag   Results from last 7 days   Lab Units 12/11/20 0622   INR  1.04   APTT seconds 28.2       HbA1C No results found for: HGBA1C  Blood Glucose       Troponin     Lipids  No results found for: CHOL, CHLPL, TRIG, HDL, LDL, LDLDIRECT    UA          Microbiology       ABG        EKG  No orders to display       Imaging:  No radiology results for the last 7 days          Xrays, labs reviewed personally by physician.    Medication Review:   I have reviewed the patient's current medication list      Scheduled Meds  aspirin, 325 mg, Oral, Daily  pantoprazole, 40 mg, Oral, Q AM        Meds Infusions  lactated ringers, 1,000 mL, Last Rate: Stopped (12/14/20 1412)  sodium chloride, 100 mL/hr        Meds PRN  •  acetaminophen **OR** acetaminophen  •  amitriptyline  •  GI cocktail  •  HYDROcodone-acetaminophen  •  HYDROmorphone **AND**  [DISCONTINUED] naloxone  •  loperamide  •  Morphine **AND** naloxone  •  ondansetron **OR** ondansetron  •  oxyCODONE  •  promethazine **OR** promethazine      Assessment/Plan   Assessment/Plan     Active Hospital Problems    Diagnosis  POA   • **Hiatal hernia with gastroesophageal reflux [K21.9, K44.9]  Yes   • Seasonal allergies [J30.2]  Yes      Resolved Hospital Problems   No resolved problems to display.       MEDICAL DECISION MAKING COMPLEXITY BY PROBLEM:     Hiatal hernia with recent laparoscopic repair and history of GERD  --12/14/20 s/p laparoscopic hiatal hernia repair  --Continue full liquid diet, cefazolin and fluid infusions per surgery  --As needed pain and nausea medication noted  --post-op reactive leukocytosis as expected     -- should resolve gradually on its own     -- no s/s of new infection  -- monitor CBC and BMP     Seasonal allergies  -- continue Zyrtec and Singulair     Depression?  -- Continue Elavil      VTE Prophylaxis  Mechanical Order History:      Ordered        12/14/20 1848  Place Sequential Compression Device  Once         12/14/20 1848  Maintain Sequential Compression Device  Continuous         12/14/20 1545  Place Sequential Compression Device  Once         12/14/20 1545  Place Sequential Compression Device  Once         12/14/20 1545  Maintain Sequential Compression Device  Continuous                 Pharmalogical Order History:     None          Code Status  Code Status and Medical Interventions:   Ordered at: 12/14/20 1545     Code Status:    CPR     Medical Interventions (Level of Support Prior to Arrest):    Full       This patient has been examined wearing appropriate Personal Protective Equipment. 12/15/20      Discharge Planning    As per surgeon.      Electronically signed by Jenny Marin MD, 12/15/20, 12:49 EST.    [facility] Hospitalist Team

## 2020-12-20 NOTE — DISCHARGE SUMMARY
Date of Discharge:  12/20/2020    Admitting Diagnosis: Type I sliding hiatal hernia with reflux    Discharge Diagnosis: Same    Presenting Problem/History of Present Illness  Active Hospital Problems    Diagnosis  POA   • **Hiatal hernia with gastroesophageal reflux [K21.9, K44.9]  Yes   • Seasonal allergies [J30.2]  Yes      Resolved Hospital Problems   No resolved problems to display.          Hospital Course  Patient is a 50 y.o. male presented with type I sliding hiatal hernia with reflux.  He underwent a laparoscopic hiatal hernia repair with transoral fundoplication.  On 1215 his first postop day he was doing well and was ready for discharge to home.  He was given instructions to lift nothing more than 15 pounds for 4 weeks and he has explicit dietary instructions which we have provided through the office already.  He is to follow-up in the office in 2 weeks.      Procedures Performed    Procedure(s):  HIATAL HERNIA REPAIR LAPAROSCOPIC WITH TRANSORAL INCISIONLESS FUNDOPLICATION  -------------------       Consults:   Consults     Date and Time Order Name Status Description    12/14/2020 1544 Inpatient Hospitalist Consult Completed           Pertinent Test Results:     Condition on Discharge: Stable    Vital Signs       Physical Exam:   Physical Exam    Discharge Disposition  Home or Self Care    Discharge Medications     Discharge Medications      New Medications      Instructions Start Date   oxyCODONE-acetaminophen  MG per tablet  Commonly known as: Percocet   1 tablet, Oral, Every 4 Hours PRN         Continue These Medications      Instructions Start Date   amitriptyline 25 MG tablet  Commonly known as: ELAVIL   25 mg, Oral, Nightly PRN      aspirin 325 MG tablet   325 mg, Oral, Daily, To stop 5 days prior per surgeon       cetirizine 10 MG tablet  Commonly known as: zyrTEC   10 mg, Oral, Every Night at Bedtime      Imodium A-D 2 MG tablet  Generic drug: loperamide   2 mg, Oral, 3 Times Daily PRN       montelukast 10 MG tablet  Commonly known as: SINGULAIR   10 mg, Oral, Nightly      multivitamin with minerals tablet tablet   Oral, Stop now for surgery       omeprazole 40 MG capsule  Commonly known as: priLOSEC   40 mg, Oral, Daily, Take dos             Discharge Diet:   Diet Instructions     Diet: Full Liquid; Thin Liquids, No Restrictions      Discharge Diet: Full Liquid    Fluid Consistency: Thin Liquids, No Restrictions          Activity at Discharge:   Activity Instructions     Driving Restrictions      Type of Restriction: Driving    Driving Restrictions: No Driving While Taking Narcotics    Gradually Increase Activity Until at Pre-Hospitalization Level            Follow-up Appointments  Future Appointments   Date Time Provider Department Center   12/29/2020  9:50 AM Tom Garcia DO [facility] None     Additional Instructions for the Follow-ups that You Need to Schedule     Discharge Follow-up with Specified Provider: Dr. Garcia; 2 Weeks   As directed      To: Dr. Garcia    Follow Up: 2 Weeks               Test Results Pending at Discharge       Tom Garcia DO  12/20/20  08:46 EST

## 2020-12-25 LAB — QT INTERVAL: 443 MS

## 2020-12-29 ENCOUNTER — OFFICE VISIT (OUTPATIENT)
Dept: SURGERY | Facility: CLINIC | Age: 50
End: 2020-12-29

## 2020-12-29 VITALS
OXYGEN SATURATION: 99 % | DIASTOLIC BLOOD PRESSURE: 93 MMHG | SYSTOLIC BLOOD PRESSURE: 126 MMHG | WEIGHT: 207.2 LBS | TEMPERATURE: 96.6 F | HEART RATE: 72 BPM | BODY MASS INDEX: 28.06 KG/M2 | HEIGHT: 72 IN

## 2020-12-29 DIAGNOSIS — K21.9 HIATAL HERNIA WITH GASTROESOPHAGEAL REFLUX: Primary | ICD-10-CM

## 2020-12-29 DIAGNOSIS — K44.9 HIATAL HERNIA WITH GASTROESOPHAGEAL REFLUX: Primary | ICD-10-CM

## 2020-12-29 PROCEDURE — 99024 POSTOP FOLLOW-UP VISIT: CPT | Performed by: SURGERY

## 2020-12-29 NOTE — PROGRESS NOTES
SUBJECTIVE:    Derick is seen in the office today follow-up from his lap hiatal hernia repair with transoral fundoplication 2 weeks ago.  He is doing well.  No fevers or chills nausea or vomiting.    OBJECTIVE:    Incisions are healing appropriately without infection    ASSESSMENT:    Satisfactory postop progress    PLAN:    Recheck in the office 2 weeks.  May increase diet to soft diet as instructed

## 2021-01-04 ENCOUNTER — TELEPHONE (OUTPATIENT)
Dept: SURGERY | Facility: CLINIC | Age: 51
End: 2021-01-04

## 2021-01-04 NOTE — TELEPHONE ENCOUNTER
Patient wants to return to work today, he states his employer will not let him return to work with any restrictions. Patient states he is currently on a 10-15 LB lifting restriction. He is to see you again in 2 weeks.  He is a  but states he does no lifting. Are you will to give a letter to return to work with no restrictions. He states he is an adult and will not do any lifting greater than 10lb. He will come  a letter if approved

## 2021-01-12 ENCOUNTER — OFFICE VISIT (OUTPATIENT)
Dept: SURGERY | Facility: CLINIC | Age: 51
End: 2021-01-12

## 2021-01-12 VITALS
SYSTOLIC BLOOD PRESSURE: 158 MMHG | HEIGHT: 72 IN | WEIGHT: 213 LBS | DIASTOLIC BLOOD PRESSURE: 93 MMHG | HEART RATE: 68 BPM | BODY MASS INDEX: 28.85 KG/M2 | TEMPERATURE: 96.8 F | OXYGEN SATURATION: 98 %

## 2021-01-12 DIAGNOSIS — K21.9 HIATAL HERNIA WITH GASTROESOPHAGEAL REFLUX: Primary | ICD-10-CM

## 2021-01-12 DIAGNOSIS — K44.9 HIATAL HERNIA WITH GASTROESOPHAGEAL REFLUX: Primary | ICD-10-CM

## 2021-01-12 PROCEDURE — 99024 POSTOP FOLLOW-UP VISIT: CPT | Performed by: SURGERY

## 2021-01-12 NOTE — PROGRESS NOTES
SUBJECTIVE:    Derick is seen in the office today follow-up from his lap hiatal hernia repair with transoral fundoplication 4 weeks ago.  In general he is doing well.  Is experiencing significant gas pains.    OBJECTIVE:    Incisions are healing appropriately without infection    ASSESSMENT:    Satisfactory postop progress    PLAN:    We discussed adding Beano or Gas-X for the gas.  I explained that as he gets back to his regular diet that that will improve.  I suggested that he stay on the same diet another 2 weeks.  He is off his proton pump inhibitor.  No heartburn.

## 2021-01-22 ENCOUNTER — TELEPHONE (OUTPATIENT)
Dept: SURGERY | Facility: CLINIC | Age: 51
End: 2021-01-22

## 2021-01-22 NOTE — TELEPHONE ENCOUNTER
Patient called and left a VM that he cancelled his upcoming appt and did not reschedule. He is doing great and gained some weight. We can call him if we have any questions.

## 2021-08-04 ENCOUNTER — ON CAMPUS - OUTPATIENT (AMBULATORY)
Dept: URBAN - METROPOLITAN AREA HOSPITAL 2 | Facility: HOSPITAL | Age: 51
End: 2021-08-04

## 2021-08-04 VITALS
SYSTOLIC BLOOD PRESSURE: 130 MMHG | HEART RATE: 73 BPM | OXYGEN SATURATION: 97 % | RESPIRATION RATE: 16 BRPM | SYSTOLIC BLOOD PRESSURE: 161 MMHG | SYSTOLIC BLOOD PRESSURE: 165 MMHG | DIASTOLIC BLOOD PRESSURE: 106 MMHG | SYSTOLIC BLOOD PRESSURE: 152 MMHG | HEART RATE: 91 BPM | OXYGEN SATURATION: 96 % | RESPIRATION RATE: 19 BRPM | WEIGHT: 213 LBS | DIASTOLIC BLOOD PRESSURE: 97 MMHG | DIASTOLIC BLOOD PRESSURE: 99 MMHG | RESPIRATION RATE: 18 BRPM | HEART RATE: 88 BPM | DIASTOLIC BLOOD PRESSURE: 82 MMHG | HEART RATE: 68 BPM | OXYGEN SATURATION: 93 % | HEIGHT: 72 IN | OXYGEN SATURATION: 94 % | TEMPERATURE: 97.5 F

## 2021-08-04 DIAGNOSIS — K44.9 DIAPHRAGMATIC HERNIA WITHOUT OBSTRUCTION OR GANGRENE: ICD-10-CM

## 2021-08-04 DIAGNOSIS — K20.0 EOSINOPHILIC ESOPHAGITIS: ICD-10-CM

## 2021-08-04 DIAGNOSIS — R13.11 DYSPHAGIA, ORAL PHASE: ICD-10-CM

## 2021-08-04 PROBLEM — K20.90 ESOPHAGITIS, UNSPECIFIED WITHOUT BLEEDING: Status: ACTIVE | Noted: 2021-08-04

## 2021-08-04 PROBLEM — K20.80 OTHER ESOPHAGITIS WITHOUT BLEEDING: Status: ACTIVE | Noted: 2021-08-04

## 2021-08-04 PROCEDURE — 43235 EGD DIAGNOSTIC BRUSH WASH: CPT | Performed by: INTERNAL MEDICINE

## 2021-08-04 PROCEDURE — 43450 DILATE ESOPHAGUS 1/MULT PASS: CPT | Performed by: INTERNAL MEDICINE

## 2021-08-04 RX ORDER — OMEPRAZOLE 40 MG/1
40 CAPSULE, DELAYED RELEASE ORAL
Qty: 90 | Refills: 3 | Status: ACTIVE
Start: 2021-08-04

## (undated) DEVICE — TOTAL TRAY, DB, 100% SILI FOLEY, 16FR 10: Brand: MEDLINE

## (undated) DEVICE — KT SURG TURNOVER 050

## (undated) DEVICE — DRSNG SURESITE WNDW 2.38X2.75

## (undated) DEVICE — SOL IRRIG H2O 1000ML STRL

## (undated) DEVICE — SOL NACL 0.9PCT 1000ML

## (undated) DEVICE — ENDOPATH XCEL WITH OPTIVIEW TECHNOLOGY UNIVERSAL TROCAR STABILITY SLEEVES: Brand: ENDOPATH XCEL OPTIVIEW

## (undated) DEVICE — UNDYED BRAIDED (POLYGLACTIN 910), SYNTHETIC ABSORBABLE SUTURE: Brand: COATED VICRYL

## (undated) DEVICE — ENDOPATH XCEL DILATING TIP TROCARS WITH STABILITY SLEEVES: Brand: ENDOPATH XCEL

## (undated) DEVICE — SYR LL TP 10ML STRL

## (undated) DEVICE — 3M™ STERI-STRIP™ REINFORCED ADHESIVE SKIN CLOSURES, R1547, 1/2 IN X 4 IN (12 MM X 100 MM), 6 STRIPS/ENVELOPE: Brand: 3M™ STERI-STRIP™

## (undated) DEVICE — SUTURING DEVICE: Brand: ENDO STITCH

## (undated) DEVICE — ENDOPATH 5MM CURVED SCISSORS WITH MONOPOLAR CAUTERY: Brand: ENDOPATH

## (undated) DEVICE — LAPAROSCOPIC GAS CONDITIONING DEVICE.: Brand: INSUFLOW

## (undated) DEVICE — DRSNG SURESITE WNDW 4X4.5

## (undated) DEVICE — ENDOPATH PNEUMONEEDLE INSUFFLATION NEEDLES WITH LUER LOCK CONNECTORS 120MM: Brand: ENDOPATH

## (undated) DEVICE — DEV LAP LIGASURE BLNT SEALER/DIV MARYLAND 37CM

## (undated) DEVICE — DRAPE, LAVH, STERILE: Brand: MEDLINE

## (undated) DEVICE — ESOPHYX Z+ FASTENER DELIVERY DEVICE WITH SEROSAFUSE FASTENERS AND ACCESSORIES: Brand: ESOPHYX Z+

## (undated) DEVICE — POLYESTER SINGLE STITCH RELOAD: Brand: SURGIDAC

## (undated) DEVICE — PETROLATUM GAUZE CISION DRESSING: Brand: VASELINE

## (undated) DEVICE — GLV SURG BIOGEL SENSR LTX PF SZ7.5

## (undated) DEVICE — SPNG GZ AVANT 6PLY 4X4IN STRL PK/2

## (undated) DEVICE — 40580 - THE PINK PAD - ADVANCED TRENDELENBURG POSITIONING KIT: Brand: 40580 - THE PINK PAD - ADVANCED TRENDELENBURG POSITIONING KIT

## (undated) DEVICE — SUT VIC 0 SUTUPAK TIES 18IN J906G

## (undated) DEVICE — GENERAL LAPAROSCOPY CDS: Brand: MEDLINE INDUSTRIES, INC.

## (undated) DEVICE — SLV SCD CALF HEMOFORCE DVT THERP REPROC MD